# Patient Record
Sex: MALE | Race: WHITE | URBAN - METROPOLITAN AREA
[De-identification: names, ages, dates, MRNs, and addresses within clinical notes are randomized per-mention and may not be internally consistent; named-entity substitution may affect disease eponyms.]

---

## 2020-07-30 ENCOUNTER — TRANSFERRED RECORDS (OUTPATIENT)
Dept: HEALTH INFORMATION MANAGEMENT | Facility: CLINIC | Age: 55
End: 2020-07-30

## 2020-07-30 ENCOUNTER — TELEPHONE (OUTPATIENT)
Dept: ORTHOPEDICS | Facility: CLINIC | Age: 55
End: 2020-07-30

## 2020-07-30 NOTE — TELEPHONE ENCOUNTER
Health Call Center    Phone Message    May a detailed message be left on voicemail: yes     Reason for Call: Other:   Pt saw Jeronimo who performed left knee surgery for pt in 2010.  Pt was informed by a local physician that pt's implant needs to be redone. Pt would like to see Jeronimo for his opinion. Unable to pull up a template for [facility]. Please call pt back to assist with scheduling.     Action Taken: Other:  ortho    Travel Screening: Not Applicable

## 2020-07-31 NOTE — TELEPHONE ENCOUNTER
ATC called pt back but the number is not in service.    ATC will look for other numbers and reach out to pt again to schedule pt with Dr. Decker.        -[place], [facility]- Orthopedics

## 2020-08-03 NOTE — TELEPHONE ENCOUNTER
ATC looked through pt's chart and there's no other numbers. ATC called pt's home phone again and it stated that the number is not in service.        Please schedule pt with Dr. Decker when pt calls.          -MICK Lino- Orthopedics

## 2020-08-04 ENCOUNTER — TRANSFERRED RECORDS (OUTPATIENT)
Dept: HEALTH INFORMATION MANAGEMENT | Facility: CLINIC | Age: 55
End: 2020-08-04

## 2020-08-10 ENCOUNTER — TELEPHONE (OUTPATIENT)
Dept: ORTHOPEDICS | Facility: CLINIC | Age: 55
End: 2020-08-10

## 2020-08-10 NOTE — TELEPHONE ENCOUNTER
[facility] Call Center    Phone Message    May a detailed message be left on voicemail: yes     Reason for Call: Other: Unable to find a template to schedule with Dr Decker but pt would like a call back at [phone]     Action Taken: Message routed to:  [facility] Ortho    Travel Screening: Not Applicable

## 2020-08-11 NOTE — TELEPHONE ENCOUNTER
ATC called pt and scheduled pt with Dr. Decker. He stated that he had XR done two wks ago at [facility] in [place], [place]. Our coordinator will request images.            -MICK Lino- Orthopedics

## 2020-08-11 NOTE — TELEPHONE ENCOUNTER
RECORDS RECEIVED FROM: pt saw Dr. Decker before for left knee surgery in 2010 // had X[facility] wks ago at [facility] [place], [place]   DATE RECEIVED: Aug 20, 2020   NOTES STATUS DETAILS   OFFICE NOTE from referring provider Internal    OFFICE NOTE from other specialist N/A    DISCHARGE SUMMARY from hospital N/A    DISCHARGE REPORT from the ER N/A    OPERATIVE REPORT Internal    MEDICATION LIST Internal    IMPLANT RECORD/STICKER Internal    LABS     CBC/DIFF N/A    CULTURES N/A    INJECTIONS DONE IN RADIOLOGY N/A    MRI N/A    CT SCAN N/A    XRAYS (IMAGES & REPORTS) In process    TUMOR     PATHOLOGY  Slides & report N/A    08/13/20   2:56 PM   Images in PACS  Concepcion Khan CMA    08/11/20   3:48 PM    records scanned to chart  Concepcion Khan CMA    08/11/20   10:20 AM   Request sent to  in [place] for images  Concepcion Khan CMA

## 2020-08-20 ENCOUNTER — OFFICE VISIT (OUTPATIENT)
Dept: ORTHOPEDICS | Facility: CLINIC | Age: 55
End: 2020-08-20
Payer: COMMERCIAL

## 2020-08-20 ENCOUNTER — PREP FOR PROCEDURE (OUTPATIENT)
Dept: ORTHOPEDICS | Facility: CLINIC | Age: 55
End: 2020-08-20

## 2020-08-20 ENCOUNTER — PRE VISIT (OUTPATIENT)
Dept: ORTHOPEDICS | Facility: CLINIC | Age: 55
End: 2020-08-20

## 2020-08-20 DIAGNOSIS — Z96.652 STATUS POST TOTAL LEFT KNEE REPLACEMENT: ICD-10-CM

## 2020-08-20 DIAGNOSIS — T84.023A: ICD-10-CM

## 2020-08-20 DIAGNOSIS — Z96.652 STATUS POST TOTAL LEFT KNEE REPLACEMENT: Primary | ICD-10-CM

## 2020-08-20 DIAGNOSIS — M25.362 INSTABILITY OF LEFT KNEE JOINT: Primary | ICD-10-CM

## 2020-08-20 PROBLEM — Z96.659 S/P TKR (TOTAL KNEE REPLACEMENT): Status: ACTIVE | Noted: 2020-08-20

## 2020-08-20 NOTE — LETTER
8/20/2020         RE: Meliton Mortensen  [address] [facility] [address]        Dear Colleague,    Thank you for referring your patient, Meliton Mortensen, to the [facility]. Please see a copy of my visit note below.    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 Decker, here today to discuss progressive left knee pain, instability, and swelling.  He reports his symptoms are activity dependent and can be reproducibly caused by activity such as climbing stairs normally, twisting while standing, or biking for over 30 minutes.  He has had to limit his activity and change how he does simple activities such as climbing up the stairs as result of the symptoms.  His pain he states he can live with, but his instability has become a real problem and he constantly needs to be cautious and aware of how he is moving his knee to avoid falling.  He denies any recent fevers, chills, night sweats, or other joint issues.    He is interested in discussing options to treat his symptoms and is looking for a more permanent solution if we have one.    No past medical history on file.    No past surgical history on file.    No family history on file.    Social History     Tobacco Use     Smoking status: Not on file   Substance Use Topics     Alcohol use: Not on file         Meds:   No current outpatient medications on file.     No current facility-administered medications for this visit.        Allergies:  No Known Allergies    Review of Systems:  See end of note    Physical Exam: There were no vitals taken for this visit.  Well-appearing fit male in no acute distress.  He is here with his wife today.  Nonlabored breathing on room air.  Well-dressed and groomed, normal affect.  Focused exam of his left lower extremity  reveals a well-healed surgical incision over the anterior knee.  There is no appreciable knee effusion today, no asymmetric knee warmth, no erythema over the knee.  His knee active range of motion is from 3 degrees to 115 degrees with no pain.  He is most focally tender over the anteromedial distal femur.  No crepitus with knee motion.  No clinically detectable instability with varus or valgus stress in full extension or 90 degrees of flexion.  No excessive motion with anterior posterior drawer.  Quad strength is 5 out of 5 actively as well as hamstring strength.  Foot is warm and well-perfused with normal sensation and palpable PT pulses.    Imaging:  X-rays of his left knee taken at outside hospital on 7/30/2020 were reviewed and compared to prior imaging on 2/2/2007.  There is new ostial lysis that is very apparent around the distal femur components stem with associated cortical widening and remodeling around the tip of the stem.  Seen well on the lateral view is ostial lysis around the anterior distal femur under the flange and at the distal femur behind the posterior condyles.  The proximal tibial component also has some notable ostial lysis most visibly on the posterior aspect of the implant, with no obvious progression in alignment of the overall implant and no obvious evidence of ostial lysis or loosening around the cemented stem.    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  We think that his distal femur component is almost certainly loose, but we are not so sure about the proximal tibial component.  We discussed options for the patient, including continued observation and activity modification and surgical options which would be a revision hinged total knee arthroplasty.  We discussed the presumed etiology of his loosening due to  ostial lysis secondary to particulate wear, but we also discussed that we would need to rule out infection prior to any surgical intervention.  The patient would like to proceed with planning for surgical revision total knee arthroplasty.    Plan:  1.  CRP and ESR will be ordered today to screen for possible prosthetic joint infection.  If these are elevated, then we will likely need to obtain a joint fluid sample to rule out prosthetic joint infection.    2.  We will start the process of scheduling a left knee revision hinged total knee arthroplasty.  This will be coordinated with Dr. Decker's schedule and the patient's preference.    Patient was also examined by Dr. Decker and he agrees with the plan of care.     Checo Gonzalez MD  Orthopedic Surgery PGY-4  Pager: [phone]      Diagnosis: Chondrosarcoma left proximal tibia    Treatment: Hinged knee replacement, 2004    Patient is seen today in consultation.  He is having symptoms of giving way pain and swelling in the left knee.  He reports this is progressive and the discomfort is unpredictable he otherwise feels well he denies fever chills or night sweats.  He remains active.    Past medical history and medications are outlined by Dr. Gonzalez.  I saw him today with Dr. Gonzalez.  I agree with his assessment and plan.    I explained to the patient has believe he should have a revision of his left hinged knee replacement.  This will certainly involve the femoral side and will likely involve the tibial side as well.  I discussed with him at length the potential complications including infection extensor weakness, DVT, postoperative plain, implant failure, postoperative hematoma excessive drainage to mention some. he understands this and would like to proceed    Impression: Loosening hinged knee left.  Particularly on the femoral side.    Plan: 1.  I will speak with [facility] about a revision strategy.  2.  I reviewed the case with   Elijah.  3.  We will schedule him for a preanesthesia clinic visit and a 4-hour surgery.    Patient was seen as a new patient today for 20 minutes to 15 minutes spent answering questions coordinating his care.    Again, thank you for allowing me to participate in the care of your patient.        Sincerely,        Mike Decker MD

## 2020-08-20 NOTE — LETTER
Date:August 21, 2020      Patient was self referred, no letter generated. Do not send.        [facility] Health Information

## 2020-08-20 NOTE — PROGRESS NOTES
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 Decker, here today to discuss progressive left knee pain, instability, and swelling.  He reports his symptoms are activity dependent and can be reproducibly caused by activity such as climbing stairs normally, twisting while standing, or biking for over 30 minutes.  He has had to limit his activity and change how he does simple activities such as climbing up the stairs as result of the symptoms.  His pain he states he can live with, but his instability has become a real problem and he constantly needs to be cautious and aware of how he is moving his knee to avoid falling.  He denies any recent fevers, chills, night sweats, or other joint issues.    He is interested in discussing options to treat his symptoms and is looking for a more permanent solution if we have one.    No past medical history on file.    No past surgical history on file.    No family history on file.    Social History     Tobacco Use     Smoking status: Not on file   Substance Use Topics     Alcohol use: Not on file         Meds:   No current outpatient medications on file.     No current facility-administered medications for this visit.        Allergies:  No Known Allergies    Review of Systems:  See end of note    Physical Exam: There were no vitals taken for this visit.  Well-appearing fit male in no acute distress.  He is here with his wife today.  Nonlabored breathing on room air.  Well-dressed and groomed, normal affect.  Focused exam of his left lower extremity reveals a well-healed surgical incision over the anterior knee.  There is no appreciable knee effusion today, no asymmetric knee warmth, no erythema over the knee.  His knee active range of motion is from 3 degrees to 115 degrees with no pain.  He is most  focally tender over the anteromedial distal femur.  No crepitus with knee motion.  No clinically detectable instability with varus or valgus stress in full extension or 90 degrees of flexion.  No excessive motion with anterior posterior drawer.  Quad strength is 5 out of 5 actively as well as hamstring strength.  Foot is warm and well-perfused with normal sensation and palpable PT pulses.    Imaging:  X-rays of his left knee taken at outside hospital on 7/30/2020 were reviewed and compared to prior imaging on 2/2/2007.  There is new ostial lysis that is very apparent around the distal femur components stem with associated cortical widening and remodeling around the tip of the stem.  Seen well on the lateral view is ostial lysis around the anterior distal femur under the flange and at the distal femur behind the posterior condyles.  The proximal tibial component also has some notable ostial lysis most visibly on the posterior aspect of the implant, with no obvious progression in alignment of the overall implant and no obvious evidence of ostial lysis or loosening around the cemented stem.    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  We think that his distal femur component is almost certainly loose, but we are not so sure about the proximal tibial component.  We discussed options for the patient, including continued observation and activity modification and surgical options which would be a revision hinged total knee arthroplasty.  We discussed the presumed etiology of his loosening due to ostial lysis secondary to particulate wear, but we also discussed that we would need to rule out infection prior to any surgical intervention.  The patient would like to proceed with planning for surgical revision total knee arthroplasty.    Plan:  1.   CRP and ESR will be ordered today to screen for possible prosthetic joint infection.  If these are elevated, then we will likely need to obtain a joint fluid sample to rule out prosthetic joint infection.    2.  We will start the process of scheduling a left knee revision hinged total knee arthroplasty.  This will be coordinated with Dr. Decker's schedule and the patient's preference.    Patient was also examined by Dr. Decker and he agrees with the plan of care.     Checo Gonzalez MD  Orthopedic Surgery PGY-4  Pager: [phone]

## 2020-08-20 NOTE — NURSING NOTE
Chief Complaint   Patient presents with     Consult     left knee pain // instability // pt had surgery with Dr. Decker 10 yrs        54 year old  1965    There were no vitals taken for this visit.      Date/Surgery/Surgeon/Hospital:  1. Left knee Surgery -Dr. Decker 2010                 Pain Assessment  Patient Currently in Pain: Yes  0-10 Pain Scale: 2(can get up to 6)  Primary Pain Location: Knee(left)               [id number] - [place], [place] - [facility] [address] [facility] AT [facility]    No Known Allergies    No current outpatient medications on file.     No current facility-administered medications for this visit.

## 2020-08-20 NOTE — PROGRESS NOTES
Diagnosis: Chondrosarcoma left proximal tibia    Treatment: Hinged knee replacement, 2004    Patient is seen today in consultation.  He is having symptoms of giving way pain and swelling in the left knee.  He reports this is progressive and the discomfort is unpredictable he otherwise feels well he denies fever chills or night sweats.  He remains active.    Past medical history and medications are outlined by Dr. Gonzalez.  I saw him today with Dr. Gonzalez.  I agree with his assessment and plan.    I explained to the patient has believe he should have a revision of his left hinged knee replacement.  This will certainly involve the femoral side and will likely involve the tibial side as well.  I discussed with him at length the potential complications including infection extensor weakness, DVT, postoperative plain, implant failure, postoperative hematoma excessive drainage to mention some. he understands this and would like to proceed    Impression: Loosening hinged knee left.  Particularly on the femoral side.    Plan: 1.  I will speak with [facility] about a revision strategy.  2.  I reviewed the case with Dr. Nava.  3.  We will schedule him for a preanesthesia clinic visit and a 4-hour surgery.    Patient was seen as a new patient today for 20 minutes to 15 minutes spent answering questions coordinating his care.

## 2020-08-20 NOTE — NURSING NOTE
Teaching Flowsheet   Relevant Diagnosis:  Revision , total arthroplasty, knee left  Teaching Topic: preop     Person(s) involved in teaching:   Patient     Motivation Level:  Asks Questions: Yes  Eager to Learn: Yes  Cooperative: Yes  Receptive (willing/able to accept information): Yes  Any cultural factors/Muslim beliefs that may influence understanding or compliance? No       Patient and Family demonstrates understanding of the following:  Reason for the appointment, diagnosis and treatment plan: Yes  Knowledge of proper use of medications and conditions for which they are ordered (with special attention to potential side effects or drug interactions): Yes  Which situations necessitate calling provider and whom to contact: Yes            Proper use and care of soap (medical equip, care aids, etc.): Yes  Nutritional needs and diet plan: Yes  Pain management techniques: Yes  Wound Care: Yes  How and/when to access community resources: NA     Instructional Materials Used/Given: surgery packet reviewed with patient.  They will have PAC H&P.  COVID and PAC calls coming.  They have no other questions.  He is prepared to go home at discharge.

## 2020-08-21 ENCOUNTER — TELEPHONE (OUTPATIENT)
Dept: ORTHOPEDICS | Facility: CLINIC | Age: 55
End: 2020-08-21

## 2020-08-21 DIAGNOSIS — Z11.59 ENCOUNTER FOR SCREENING FOR OTHER VIRAL DISEASES: Primary | ICD-10-CM

## 2020-08-21 PROBLEM — Z96.652 STATUS POST TOTAL LEFT KNEE REPLACEMENT: Status: ACTIVE | Noted: 2020-08-21

## 2020-08-21 NOTE — TELEPHONE ENCOUNTER
Patient is scheduled for surgery with Dr. Decker      Spoke or left message with: Spoke with Meliton    Date of Surgery: 8/31/20    Location: [place]    Informed patient they will need an adult  Yes    Pre-op with surgeon (if applicable): n/a    H&P: Scheduled with PAC    Additional imaging/appointments: covid test scheduled    Surgery packet: Given in clinic     Additional comments: patient will receive arrival time at PAC appointment.

## 2020-08-21 NOTE — TELEPHONE ENCOUNTER
Attempted to reach out to patient to discuss scheduling surgery with Dr. Decker. Left detailed message that first available would be 8/31/20. Also informed patient we will need to get him scheduled for a PAC appointment. Left best call back number of [phone]

## 2020-08-24 NOTE — TELEPHONE ENCOUNTER
FUTURE VISIT INFORMATION      SURGERY INFORMATION:    Date: 20    Location: [facility]    Surgeon:  Mike Decker MD     Anesthesia Type:  general    Procedure: REVISION, TOTAL ARTHROPLASTY, KNEE     Consult: ov     RECORDS REQUESTED FROM:       Most recent EKG+ Tracin2006

## 2020-08-27 ENCOUNTER — PRE VISIT (OUTPATIENT)
Dept: SURGERY | Facility: CLINIC | Age: 55
End: 2020-08-27

## 2020-08-27 ENCOUNTER — OFFICE VISIT (OUTPATIENT)
Dept: SURGERY | Facility: CLINIC | Age: 55
End: 2020-08-27
Payer: COMMERCIAL

## 2020-08-27 ENCOUNTER — ANESTHESIA EVENT (OUTPATIENT)
Dept: SURGERY | Facility: CLINIC | Age: 55
DRG: 468 | End: 2020-08-27
Payer: COMMERCIAL

## 2020-08-27 VITALS
TEMPERATURE: 98.3 F | SYSTOLIC BLOOD PRESSURE: 123 MMHG | DIASTOLIC BLOOD PRESSURE: 83 MMHG | OXYGEN SATURATION: 97 % | RESPIRATION RATE: 16 BRPM | BODY MASS INDEX: 27.3 KG/M2 | WEIGHT: 206 LBS | HEIGHT: 73 IN | HEART RATE: 78 BPM

## 2020-08-27 DIAGNOSIS — T84.023A: ICD-10-CM

## 2020-08-27 DIAGNOSIS — Z01.818 PREOP EXAMINATION: Primary | ICD-10-CM

## 2020-08-27 DIAGNOSIS — T84.84XD PAIN DUE TO TOTAL LEFT KNEE REPLACEMENT, SUBSEQUENT ENCOUNTER: ICD-10-CM

## 2020-08-27 DIAGNOSIS — Z01.818 PREOP EXAMINATION: ICD-10-CM

## 2020-08-27 DIAGNOSIS — Z11.59 ENCOUNTER FOR SCREENING FOR OTHER VIRAL DISEASES: ICD-10-CM

## 2020-08-27 DIAGNOSIS — Z96.652 PAIN DUE TO TOTAL LEFT KNEE REPLACEMENT, SUBSEQUENT ENCOUNTER: ICD-10-CM

## 2020-08-27 LAB
ANION GAP SERPL CALCULATED.3IONS-SCNC: 2 MMOL/L (ref 3–14)
BUN SERPL-MCNC: 27 MG/DL (ref 7–30)
CALCIUM SERPL-MCNC: 9.8 MG/DL (ref 8.5–10.1)
CHLORIDE SERPL-SCNC: 106 MMOL/L (ref 94–109)
CO2 SERPL-SCNC: 31 MMOL/L (ref 20–32)
CREAT SERPL-MCNC: 1.42 MG/DL (ref 0.66–1.25)
CRP SERPL-MCNC: 3.5 MG/L (ref 0–8)
ERYTHROCYTE [DISTWIDTH] IN BLOOD BY AUTOMATED COUNT: 13.1 % (ref 10–15)
ERYTHROCYTE [SEDIMENTATION RATE] IN BLOOD BY WESTERGREN METHOD: 9 MM/H (ref 0–20)
GFR SERPL CREATININE-BSD FRML MDRD: 55 ML/MIN/{1.73_M2}
GLUCOSE SERPL-MCNC: 93 MG/DL (ref 70–99)
HCT VFR BLD AUTO: 47.3 % (ref 40–53)
HGB BLD-MCNC: 16.1 G/DL (ref 13.3–17.7)
MCH RBC QN AUTO: 30.7 PG (ref 26.5–33)
MCHC RBC AUTO-ENTMCNC: 34 G/DL (ref 31.5–36.5)
MCV RBC AUTO: 90 FL (ref 78–100)
PLATELET # BLD AUTO: 184 10E9/L (ref 150–450)
POTASSIUM SERPL-SCNC: 4.9 MMOL/L (ref 3.4–5.3)
RBC # BLD AUTO: 5.24 10E12/L (ref 4.4–5.9)
SODIUM SERPL-SCNC: 139 MMOL/L (ref 133–144)
WBC # BLD AUTO: 7.8 10E9/L (ref 4–11)

## 2020-08-27 ASSESSMENT — LIFESTYLE VARIABLES: TOBACCO_USE: 0

## 2020-08-27 ASSESSMENT — COPD QUESTIONNAIRES: COPD: 0

## 2020-08-27 ASSESSMENT — MIFFLIN-ST. JEOR: SCORE: 1828.29

## 2020-08-27 ASSESSMENT — PAIN SCALES - GENERAL: PAINLEVEL: MODERATE PAIN (4)

## 2020-08-27 NOTE — PATIENT INSTRUCTIONS
Preparing for Your Surgery      Name:  Meliton Mortensen   MRN:  [id number]   :  1965   Today's Date:  2020       Arriving for surgery:  Surgery date:  20  Arrival time:  11:30 am    Restrictions due to COVID 19:  Patients are allowed one visitor in the pre-op period  All visitors must wear a mask  No visitors under 18  No ill visitors   parking is not available     Please come to:     [facility] [address]  [address] [place] [address]  [id number]    -Enter through the [facility] entrance. If you prefer, park your car in the Green Lot.    -Proceed to the 3rd floor, check in at the Adult Surgery Waiting Lounge. [phone]    If an escort is needed stop at the Information Desk in the lobby. Inform the information person that you are here for surgery. An escort to the Adult Surgery Waiting Lounge will be provided.    What can I eat or drink?  -  You may eat and drink normally for up to 8 hours before your surgery. (Until 6:00 am)  -  You may have clear liquids until 2 hours before surgery. (Until 11:30 am)    Examples of clear liquids:  Water  Clear broth  Juices (apple, white grape, white cranberry  and cider) without pulp  Noncarbonated, powder based beverages  (lemonade and Rafita-Aid)  Sodas (Sprite, 7-Up, ginger ale and seltzer)  Coffee or tea (without milk or cream)  Gatorade    -  No Alcohol for at least 24 hours before surgery     Which medicines can I take?    Hold Aspirin for 7 days before surgery.   Hold Multivitamins for 7 days before surgery.  Hold Supplements for 7 days before surgery.  Hold Ibuprofen (Advil, Motrin) for 1 day before surgery--unless otherwise directed by surgeon.  Hold Naproxen (Aleve) for 4 days before surgery.    How do I prepare myself?  - Please take 2 showers before surgery using Scrubcare or Hibiclens soap.    Use this soap only from the neck to your toes.     Leave the soap on your skin for one minute--then  rinse thoroughly.      You may use your own shampoo and conditioner; no other hair products.   - Please remove all jewelry and body piercings.  - No lotions, deodorants or fragrance.  - Bring your ID and insurance card.    - All patients are required to have a Covid-19 test within 4 days of surgery/procedure.      -Patients will be contacted by the [facility] scheduling team within 1 week of surgery to make an appointment.      - Patients may call the Scheduling team at [phone] if they have not been scheduled within 4 days of  surgery.      ALL PATIENTS GOING HOME THE SAME DAY OF SURGERY ARE REQUIRED TO HAVE A RESPONSIBLE ADULT TO DRIVE AND BE IN ATTENDANCE WITH THEM FOR 24 HOURS FOLLOWING SURGERY     Questions or Concerns:    - For any questions regarding the day of surgery or your hospital stay, please contact the Pre Admission Nursing Office at [phone].       - If you have health changes between today and your surgery please call your surgeon.       For questions after surgery please call your surgeons office.     Enhanced Recovery After Surgery     This is a team effort, including you, to get you back on your feet, eating and drinking normally and out of the hospital as quickly as possible.  The goals are:    1) NO INFECTIONS and   2) RETURN TO NORMAL DIET    How can we achieve these goals?  1) STAY ACTIVE: Walk every day before your surgery; try to increase the amount every day.  Walk after surgery as much as you can-the nurses will help you.  Walking speeds healing and gets you home quicker, you heal better at home and have less risk of infection.     2) INCENTIVE SPIROMETER: Practice your incentive spirometer 4 times per day with 5 repetitions each time.  Using the incentive spirometer can strengthen your muscles between your ribs and help you have a strong cough after surgery.  A more effective cough can help prevent problems with your lungs.    3) STAY HYDRATED: Drink clear liquids up until  2 hours before your surgery. We would like you to purchase a drink such as Gatorade or Ensure Clear (not the milkshake type).  Drink this before bedtime and on the way into the hospital, drink between 8-10 ounces or until you feel hydrated.  Keeping well hydrated leads to your veins being plump, you wake up faster, and you are less likely to be nauseated. Start drinking water as soon as you can after surgery and advance to clear liquids and food as tolerated.  IV fluids contain salt, drinking fluids will minimize the amount of IV fluids you need and decrease the amount of salt you get.    The most common reason for the patient to be readmitted is dehydration. Staying hydrated after you go home from the hospital is very important.  Ensure or Ensure Clear are good options to keep you hydrated.     4) PAIN MANAGEMENT: If we minimize the amount of opioids and narcotics, and use regional blocks (which numb the area where your surgery is) along with oral pain medications; you will have less side effects of nausea and constipation. Narcotics can slow down your bowels and cause you to stay in the hospital longer.     Our goal is to keep you comfortable; eating and drinking normally and back home safely.

## 2020-08-27 NOTE — ANESTHESIA PREPROCEDURE EVALUATION
Anesthesia Pre-Procedure Evaluation    Patient: Meliton Mortensen   MRN:     [id number] Gender:   male   Age:    54 year old :      1965        Preoperative Diagnosis: Status post total left knee replacement [Z96.652]   Procedure(s):  REVISION, TOTAL ARTHROPLASTY, KNEE     LABS:  CBC:   Lab Results   Component Value Date    WBC 10.9 2007    WBC 7.7 2006    HGB 11.0 (L) 2007    HGB 11.2 (L) 2006    HCT 32.1 (L) 2007    HCT 42.3 2006     (H) 2007     2006     BMP:   Lab Results   Component Value Date     2006     2006    POTASSIUM 4.2 2006    POTASSIUM 4.7 2006    CHLORIDE 103 2006    CHLORIDE 107 2006    CO2 29 2006    CO2 29 2006    BUN 19 2006    BUN 27 (H) 2006    CR 1.30 2006    CR 1.30 2006     (H) 2006    GLC 90 2006     COAGS:   Lab Results   Component Value Date    INR 1.57 (H) 2007     POC:   Lab Results   Component Value Date    BGM 82 2006     OTHER:   Lab Results   Component Value Date    DAVID 8.3 (L) 2006        Preop Vitals    BP Readings from Last 3 Encounters:   No data found for BP    Pulse Readings from Last 3 Encounters:   No data found for Pulse      Resp Readings from Last 3 Encounters:   No data found for Resp    SpO2 Readings from Last 3 Encounters:   No data found for SpO2      Temp Readings from Last 1 Encounters:   No data found for Temp    Ht Readings from Last 1 Encounters:   No data found for Ht      Wt Readings from Last 1 Encounters:   No data found for Wt    There is no height or weight on file to calculate BMI.     LDA:        No past medical history on file.   No past surgical history on file.   No Known Allergies     Anesthesia Evaluation     . Pt has had prior anesthetic. Type: General    No history of anesthetic complications          ROS/MED HX    ENT/Pulmonary:      (-) tobacco use, asthma,  COPD, ERWIN risk factors and recent URI   Neurologic:     (+)neuropathy - feet,     Cardiovascular:  - neg cardiovascular ROS   (+) ----. : . . . :. . No previous cardiac testing       METS/Exercise Tolerance:  >4 METS   Hematologic:  - neg hematologic  ROS   (+) History of Transfusion no previous transfusion reaction -     (-) history of blood clots   Musculoskeletal:   (+)  other musculoskeletal- left pain, swelling and instability      GI/Hepatic:  - neg GI/hepatic ROS       Renal/Genitourinary:     (+) Other Renal/ Genitourinary, solitary kidney      Endo:  - neg endo ROS       Psychiatric:  - neg psychiatric ROS       Infectious Disease:  - neg infectious disease ROS      (-) Recent Fever   Malignancy:   (+) Malignancy History of Other  Other CA pheochromocytoma, chondrosarcoma Remission status post Surgery, Chemo and Radiation         Other:    (+) H/O Chronic Pain,                       PHYSICAL EXAM:   Mental Status/Neuro: A/A/O   Airway: Facies: Feasible  Mallampati: I  Mouth/Opening: Full  TM distance: > 6 cm  Neck ROM: Full   Respiratory: Auscultation: CTAB     Resp. Rate: Normal     Resp. Effort: Normal      CV: Rhythm: Regular  Rate: Age appropriate  Heart: Normal Sounds  Edema: LLE   Comments: #7 capped     Dental: Normal Dentition                Assessment:   ASA SCORE: 3    H&P: History and physical reviewed and following examination; no interval change.   Smoking Status:  Non-Smoker/Unknown   NPO Status: NPO Appropriate     Plan:   Anes. Type:  General   Pre-Medication: None   Induction:  IV (Standard)   Airway: ETT; Oral   Access/Monitoring: PIV   Maintenance: Balanced     Postop Plan:   Postop Pain: Opioids  Postop Sedation/Airway: Not planned     PONV Management:   Adult Risk Factors:, Non-Smoker, Postop Opioids   Prevention: Ondansetron, Dexamethasone     CONSENT: Direct conversation   Plan and risks discussed with: Patient   Blood Products: Consented (ALL Blood Products)                PAC  Discussion and Assessment    ASA Classification: 2  Case is suitable for: [facility]  Anesthetic techniques and relevant risks discussed: GA  Invasive monitoring and risk discussed:   Types:   Possibility and Risk of blood transfusion discussed:   NPO instructions given:   Additional anesthetic preparation and risks discussed:   Needs early admission to pre-op area:   Other:     PAC Resident/NP Anesthesia Assessment:  Meliton Mortensen is a 54 year old male scheduled for a REVISION, TOTAL ARTHROPLASTY, KNEE, left on 8/31/20 by Dr. Decker in treatment of left knee pain, swelling and instability.  PAC referral for risk assessment and optimization for anesthesia with comorbid conditions of: history of pheochromocytoma (multiple locations) and left leg chondrosarcoma.      Pre-operative considerations:  1.  Cardiac:  Functional status- METS >4.  He enjoys biking, walking and kayaking for exercise.  He has no known cardiac conditions.  Intermediate risk surgery with 0.4% risk of major adverse cardiac event.   2.  Pulm:  Airway feasible.  ERWIN risk: low  3.  GI:  Risk of PONV score = 2.  If > 2, anti-emetic intervention recommended.  4. Renal: s/p right nephrectomy due to pheochromocytoma.   Creatinine today is elevated at 1.42.  I have no prior labs to compare it to.  Patient has been called and notified.  Encouraged to hydrate well between now and surgery.  Will order repeat testing for DOS.         VTE risk: 0.5%    Patient is optimized and is acceptable candidate for the proposed procedure.  No further diagnostic evaluation is needed.      **For further details of assessment, testing, and physical exam please see H and P completed on same date.          Keshia Recinos DNP, RN, APRN      Reviewed and Signed by PAC Mid-Level Provider/Resident  Mid-Level Provider/Resident: Keshia Recinos DNP, RN, APRN  Date: 8/27/20  Time: 1535    Attending Anesthesiologist Anesthesia Assessment:        Anesthesiologist:   Date:   Time:    Pass/Fail:   Disposition:     PAC Pharmacist Assessment:        Pharmacist:   Date:   Time:    FERNANDA Vázquez CNP

## 2020-08-27 NOTE — H&P
Pre-Operative H & P     CC:  Preoperative exam to assess for increased cardiopulmonary risk while undergoing surgery and anesthesia.    Date of Encounter: 8/27/2020  Primary Care Physician:  No primary care provider on file.  Associated diagnosis:  Left knee pain, swelling and instability    HPI  Meliton Mortensen is a 54 year old male who presents for pre-operative H & P in preparation for a REVISION, TOTAL ARTHROPLASTY, KNEE, left on 8/31/20 by Dr. Decker at [facility].     Meliton Mortensen is a 54 year old male with history of pheochromocytoma (multiple locations) and left leg chondrosarcoma that had a left knee replacement by Dr. Decker in 2006 due to a chondrosarcoma.  He started having pain, instability and swelling in the left knee about 1.5 years ago.  The knee instability has caused him to have to limit his activity some.  It was recommended by an outside provider that he consider repeat replacement so he returned here to consult with Dr. Decker.  The above listed surgery has now been recommended for treatment.      History is obtained from the patient and the medical record.     Past Medical History  Past Medical History:   Diagnosis Date     History of chondrosarcoma     left knee     Pheochromocytoma      Solitary kidney     s/p nephrectomy for pheochromocytoma       Past Surgical History  Past Surgical History:   Procedure Laterality Date     ABDOMEN SURGERY      pheochromocytoma resection x 2     APPENDECTOMY  1975     ARTHROSCOPY KNEE Left     ACL repair     AS TOTAL KNEE ARTHROPLASTY Left 2006     CARPAL TUNNEL RELEASE RT/LT Right      CRANIOTOMY  1991    pheochromocytoma resection     HERNIA REPAIR  2010     HRW SYLVESTER CATH Left      INNER EAR SURGERY      perferation repair     NEPHRECTOMY RT/LT Right     secondary to pheochromocytoma     RELEASE ULNAR NERVE (ELBOW) Bilateral        Hx of Blood transfusions/reactions: yes, no reactions    Hx of  abnormal bleeding or anti-platelet use: none    Steroid use in the last year: none    Personal or FH with difficulty with Anesthesia:  none    Prior to Admission Medications  No current outpatient medications on file.       Allergies  No Known Allergies    Social History  Social History     Socioeconomic History     Marital status:      Spouse name: Not on file     Number of children: 3     Years of education: Not on file     Highest education level: Not on file   Occupational History     Occupation:    Social Needs     Financial resource strain: Not on file     Food insecurity     Worry: Not on file     Inability: Not on file     Transportation needs     Medical: Not on file     Non-medical: Not on file   Tobacco Use     Smoking status: Never Smoker     Smokeless tobacco: Never Used   Substance and Sexual Activity     Alcohol use: Yes     Alcohol/week: 4.0 standard drinks     Types: 4 Standard drinks or equivalent per week     Drug use: Never     Sexual activity: Not on file   Lifestyle     Physical activity     Days per week: Not on file     Minutes per session: Not on file     Stress: Not on file   Relationships     Social connections     Talks on phone: Not on file     Gets together: Not on file     Attends Gnosticism service: Not on file     Active member of club or organization: Not on file     Attends meetings of clubs or organizations: Not on file     Relationship status: Not on file     Intimate partner violence     Fear of current or ex partner: Not on file     Emotionally abused: Not on file     Physically abused: Not on file     Forced sexual activity: Not on file   Other Topics Concern     Not on file   Social History Narrative     Not on file       Family History  Family History   Problem Relation Age of Onset     Arthritis Mother      Valvular heart disease Father      No Known Problems Sister      No Known Problems Brother      No Known Problems Sister      No Known Problems  "Sister      No Known Problems Sister      No Known Problems Brother      No Known Problems Brother      No Known Problems Brother                ROS/MED HX  The complete review of systems is negative other than noted in the HPI or here.   ENT/Pulmonary:      (-) tobacco use, asthma, COPD, ERWIN risk factors and recent URI   Neurologic:     (+)neuropathy - feet,     Cardiovascular:  - neg cardiovascular ROS   (+) ----. : . . . :. . No previous cardiac testing       METS/Exercise Tolerance:  >4 METS   Hematologic:  - neg hematologic  ROS   (+) History of Transfusion no previous transfusion reaction -     (-) history of blood clots   Musculoskeletal:   (+)  other musculoskeletal- left pain, swelling and instability      GI/Hepatic:  - neg GI/hepatic ROS       Renal/Genitourinary:     (+) Other Renal/ Genitourinary, solitary kidney      Endo:  - neg endo ROS       Psychiatric:  - neg psychiatric ROS       Infectious Disease:  - neg infectious disease ROS      (-) Recent Fever   Malignancy:   (+) Malignancy History of Other  Other CA pheochromocytoma, chondrosarcoma Remission status post Surgery, Chemo and Radiation         Other:    (+) H/O Chronic Pain,                       PHYSICAL EXAM:   Mental Status/Neuro: A/A/O   Airway: Facies: Feasible  Mallampati: I  Mouth/Opening: Full  TM distance: > 6 cm  Neck ROM: Full   Respiratory: Auscultation: CTAB     Resp. Rate: Normal     Resp. Effort: Normal      CV: Rhythm: Regular  Rate: Age appropriate  Heart: Normal Sounds  Edema: None   Comments: #7 capped     Dental: Normal Dentition                  Temp: 98.3  F (36.8  C) Temp src: Oral BP: 123/83 Pulse: 78   Resp: 16 SpO2: 97 %         206 lbs 0 oz  6' 1\"   Body mass index is 27.18 kg/m .       Physical Exam  Constitutional: Awake, alert, cooperative, no apparent distress, and appears stated age.  Eyes: Pupils equal, round and reactive to light, extra ocular muscles intact, sclera clear, conjunctiva normal.  HENT: " Normocephalic, oral pharynx with moist mucus membranes, good dentition. No goiter appreciated.   Respiratory: Clear to auscultation bilaterally, no crackles or wheezing.  Cardiovascular: Regular rate and rhythm, normal S1 and S2, and no murmur noted.  Carotids +2, no bruits. 1+ LLE edema. Palpable pulses to radial  DP and PT arteries.   GI: Normal bowel sounds, soft, non-distended, non-tender, no masses palpated, no hepatosplenomegaly.  Surgical scars: midline abdomen  Lymph/Hematologic: No cervical lymphadenopathy and no supraclavicular lymphadenopathy.  Genitourinary:  deferred  Skin: Warm and dry.  No rashes at anticipated surgical site.   Musculoskeletal: Full ROM of neck. There is no redness, warmth, or swelling of the exposed joints except for swelling of the left knee.  Gross motor strength is normal.    Neurologic: Awake, alert, oriented to name, place and time. Cranial nerves II-XII are grossly intact. Gait is normal.   Neuropsychiatric: Calm, cooperative. Normal affect.     Labs: (personally reviewed)   Component      Latest Ref Rng & Units 8/27/2020   Sodium      133 - 144 mmol/L 139   Potassium      3.4 - 5.3 mmol/L 4.9   Chloride      94 - 109 mmol/L 106   Carbon Dioxide      20 - 32 mmol/L 31   Anion Gap      3 - 14 mmol/L 2 (L)   Glucose      70 - 99 mg/dL 93   Urea Nitrogen      7 - 30 mg/dL 27   Creatinine      0.66 - 1.25 mg/dL 1.42 (H)   GFR Estimate      >60 mL/min/1.73:m2 55 (L)   GFR Estimate If Black      >60 mL/min/1.73:m2 64   Calcium      8.5 - 10.1 mg/dL 9.8   WBC      4.0 - 11.0 10e9/L 7.8   RBC Count      4.4 - 5.9 10e12/L 5.24   Hemoglobin      13.3 - 17.7 g/dL 16.1   Hematocrit      40.0 - 53.0 % 47.3   MCV      78 - 100 fl 90   MCH      26.5 - 33.0 pg 30.7   MCHC      31.5 - 36.5 g/dL 34.0   RDW      10.0 - 15.0 % 13.1   Platelet Count      150 - 450 10e9/L 184         ASSESSMENT and PLAN  Meliton Mortensen is a 54 year old male scheduled for a REVISION, TOTAL ARTHROPLASTY, KNEE, left on  8/31/20 by Dr. Decker in treatment of left knee pain, swelling and instability.  PAC referral for risk assessment and optimization for anesthesia with comorbid conditions of: history of pheochromocytoma (multiple locations) and left leg chondrosarcoma.      Pre-operative considerations:  1.  Cardiac:  Functional status- METS >4.  He enjoys biking, walking and kayaking for exercise.  He has no known cardiac conditions.  Intermediate risk surgery with 0.4% risk of major adverse cardiac event.   2.  Pulm:  Airway feasible.  ERWIN risk: low  3.  GI:  Risk of PONV score = 2.  If > 2, anti-emetic intervention recommended.  4. Renal: s/p right nephrectomy due to pheochromocytoma.   Creatinine today is elevated at 1.42.  I have no prior labs to compare it to.  Patient has been called and notified.  Encouraged to hydrate well between now and surgery.  Will order repeat testing for DOS.       VTE risk: 0.5%    Patient is optimized and is acceptable candidate for the proposed procedure.  No further diagnostic evaluation is needed.            Keshia Recinos DNP, RN, APRN  Preoperative Assessment Center  [facility]  Phone: [phone]  Fax: [phone]

## 2020-08-28 LAB
SARS-COV-2 RNA SPEC QL NAA+PROBE: NOT DETECTED
SPECIMEN SOURCE: NORMAL

## 2020-08-31 ENCOUNTER — ANESTHESIA (OUTPATIENT)
Dept: SURGERY | Facility: CLINIC | Age: 55
DRG: 468 | End: 2020-08-31
Payer: COMMERCIAL

## 2020-08-31 ENCOUNTER — ANCILLARY PROCEDURE (OUTPATIENT)
Dept: ULTRASOUND IMAGING | Facility: CLINIC | Age: 55
End: 2020-08-31
Attending: ANESTHESIOLOGY
Payer: COMMERCIAL

## 2020-08-31 ENCOUNTER — HOSPITAL ENCOUNTER (INPATIENT)
Facility: CLINIC | Age: 55
LOS: 3 days | Discharge: HOME | DRG: 468 | End: 2020-09-03
Attending: ORTHOPAEDIC SURGERY | Admitting: ORTHOPAEDIC SURGERY
Payer: COMMERCIAL

## 2020-08-31 ENCOUNTER — APPOINTMENT (OUTPATIENT)
Dept: GENERAL RADIOLOGY | Facility: CLINIC | Age: 55
DRG: 468 | End: 2020-08-31
Attending: ORTHOPAEDIC SURGERY
Payer: COMMERCIAL

## 2020-08-31 ENCOUNTER — APPOINTMENT (OUTPATIENT)
Dept: GENERAL RADIOLOGY | Facility: CLINIC | Age: 55
DRG: 468 | End: 2020-08-31
Attending: STUDENT IN AN ORGANIZED HEALTH CARE EDUCATION/TRAINING PROGRAM
Payer: COMMERCIAL

## 2020-08-31 DIAGNOSIS — Z96.652 STATUS POST TOTAL LEFT KNEE REPLACEMENT: ICD-10-CM

## 2020-08-31 DIAGNOSIS — Z98.890 STATUS POST KNEE SURGERY: Primary | ICD-10-CM

## 2020-08-31 LAB
ABO + RH BLD: NORMAL
ABO + RH BLD: NORMAL
BLD GP AB SCN SERPL QL: NORMAL
BLOOD BANK CMNT PATIENT-IMP: NORMAL
BLOOD BANK CMNT PATIENT-IMP: NORMAL
CREAT SERPL-MCNC: 1.26 MG/DL (ref 0.66–1.25)
GFR SERPL CREATININE-BSD FRML MDRD: 64 ML/MIN/{1.73_M2}
GLUCOSE BLDC GLUCOMTR-MCNC: 74 MG/DL (ref 70–99)
SPECIMEN EXP DATE BLD: NORMAL

## 2020-08-31 PROCEDURE — 36000072 ZZH SURGERY LEVEL 5 W FLUORO 1ST 30 MIN - UMMC: Performed by: ORTHOPAEDIC SURGERY

## 2020-08-31 PROCEDURE — 25000566 ZZH SEVOFLURANE, EA 15 MIN: Performed by: ORTHOPAEDIC SURGERY

## 2020-08-31 PROCEDURE — 87176 TISSUE HOMOGENIZATION CULTR: CPT | Performed by: ORTHOPAEDIC SURGERY

## 2020-08-31 PROCEDURE — 40000986 XR KNEE PORT LT 1/2 VW: Mod: LT

## 2020-08-31 PROCEDURE — 25000128 H RX IP 250 OP 636: Performed by: ANESTHESIOLOGY

## 2020-08-31 PROCEDURE — 37000009 ZZH ANESTHESIA TECHNICAL FEE, EACH ADDTL 15 MIN: Performed by: ORTHOPAEDIC SURGERY

## 2020-08-31 PROCEDURE — 82565 ASSAY OF CREATININE: CPT | Performed by: NURSE PRACTITIONER

## 2020-08-31 PROCEDURE — 25800025 ZZH RX 258: Performed by: ORTHOPAEDIC SURGERY

## 2020-08-31 PROCEDURE — 87075 CULTR BACTERIA EXCEPT BLOOD: CPT | Performed by: ORTHOPAEDIC SURGERY

## 2020-08-31 PROCEDURE — 00000146 ZZHCL STATISTIC GLUCOSE BY METER IP

## 2020-08-31 PROCEDURE — 27210794 ZZH OR GENERAL SUPPLY STERILE: Performed by: ORTHOPAEDIC SURGERY

## 2020-08-31 PROCEDURE — 0SPD0JZ REMOVAL OF SYNTHETIC SUBSTITUTE FROM LEFT KNEE JOINT, OPEN APPROACH: ICD-10-PCS | Performed by: ORTHOPAEDIC SURGERY

## 2020-08-31 PROCEDURE — 25000125 ZZHC RX 250: Performed by: NURSE ANESTHETIST, CERTIFIED REGISTERED

## 2020-08-31 PROCEDURE — 40000170 ZZH STATISTIC PRE-PROCEDURE ASSESSMENT II: Performed by: ORTHOPAEDIC SURGERY

## 2020-08-31 PROCEDURE — 25000128 H RX IP 250 OP 636: Performed by: STUDENT IN AN ORGANIZED HEALTH CARE EDUCATION/TRAINING PROGRAM

## 2020-08-31 PROCEDURE — 37000008 ZZH ANESTHESIA TECHNICAL FEE, 1ST 30 MIN: Performed by: ORTHOPAEDIC SURGERY

## 2020-08-31 PROCEDURE — 0SRD0JA REPLACEMENT OF LEFT KNEE JOINT WITH SYNTHETIC SUBSTITUTE, UNCEMENTED, OPEN APPROACH: ICD-10-PCS | Performed by: ORTHOPAEDIC SURGERY

## 2020-08-31 PROCEDURE — 25000132 ZZH RX MED GY IP 250 OP 250 PS 637: Performed by: STUDENT IN AN ORGANIZED HEALTH CARE EDUCATION/TRAINING PROGRAM

## 2020-08-31 PROCEDURE — 71000014 ZZH RECOVERY PHASE 1 LEVEL 2 FIRST HR: Performed by: ORTHOPAEDIC SURGERY

## 2020-08-31 PROCEDURE — 0QPC04Z REMOVAL OF INTERNAL FIXATION DEVICE FROM LEFT LOWER FEMUR, OPEN APPROACH: ICD-10-PCS | Performed by: ORTHOPAEDIC SURGERY

## 2020-08-31 PROCEDURE — 25000128 H RX IP 250 OP 636: Performed by: NURSE ANESTHETIST, CERTIFIED REGISTERED

## 2020-08-31 PROCEDURE — 12000001 ZZH R&B MED SURG/OB UMMC

## 2020-08-31 PROCEDURE — C1776 JOINT DEVICE (IMPLANTABLE): HCPCS | Performed by: ORTHOPAEDIC SURGERY

## 2020-08-31 PROCEDURE — 36000070 ZZH SURGERY LEVEL 5 EA 15 ADDTL MIN - UMMC: Performed by: ORTHOPAEDIC SURGERY

## 2020-08-31 PROCEDURE — 25000125 ZZHC RX 250: Performed by: ANESTHESIOLOGY

## 2020-08-31 PROCEDURE — 40000278 XR SURGERY CARM FLUORO LESS THAN 5 MIN: Mod: TC

## 2020-08-31 PROCEDURE — 25000132 ZZH RX MED GY IP 250 OP 250 PS 637: Performed by: PHYSICIAN ASSISTANT

## 2020-08-31 PROCEDURE — 27810169 ZZH OR IMPLANT GENERAL: Performed by: ORTHOPAEDIC SURGERY

## 2020-08-31 PROCEDURE — 36415 COLL VENOUS BLD VENIPUNCTURE: CPT | Performed by: NURSE PRACTITIONER

## 2020-08-31 PROCEDURE — 25000128 H RX IP 250 OP 636: Performed by: PHYSICIAN ASSISTANT

## 2020-08-31 PROCEDURE — 87070 CULTURE OTHR SPECIMN AEROBIC: CPT | Performed by: ORTHOPAEDIC SURGERY

## 2020-08-31 PROCEDURE — 25800030 ZZH RX IP 258 OP 636: Performed by: NURSE ANESTHETIST, CERTIFIED REGISTERED

## 2020-08-31 DEVICE — MODULAR ROTATING HINGE KNEE-AXLE
Type: IMPLANTABLE DEVICE | Site: KNEE | Status: FUNCTIONAL
Brand: MRH

## 2020-08-31 DEVICE — MRH TIBIAL ROTATING COMPONENT
Type: IMPLANTABLE DEVICE | Site: KNEE | Status: FUNCTIONAL
Brand: MRH

## 2020-08-31 DEVICE — MRH KNEE FEMORAL BUSHING
Type: IMPLANTABLE DEVICE | Site: KNEE | Status: FUNCTIONAL
Brand: MRH

## 2020-08-31 DEVICE — MRH KNEE BUMPER INSERT
Type: IMPLANTABLE DEVICE | Site: KNEE | Status: FUNCTIONAL
Brand: MRH

## 2020-08-31 DEVICE — BONE CEMENT SIMPLEX W/TOBRAMYCIN 6197-9-001: Type: IMPLANTABLE DEVICE | Site: KNEE | Status: FUNCTIONAL

## 2020-08-31 DEVICE — MRH KNEE TIBIAL SLEEVE
Type: IMPLANTABLE DEVICE | Site: KNEE | Status: FUNCTIONAL
Brand: MRH

## 2020-08-31 DEVICE — MRH KNEE TIBIAL INSERT
Type: IMPLANTABLE DEVICE | Site: KNEE | Status: FUNCTIONAL
Brand: MRH

## 2020-08-31 DEVICE — MRH KNEE FEMORAL COMPONENT
Type: IMPLANTABLE DEVICE | Site: KNEE | Status: FUNCTIONAL
Brand: MRH

## 2020-08-31 DEVICE — IMPLANTABLE DEVICE: Type: IMPLANTABLE DEVICE | Site: KNEE | Status: FUNCTIONAL

## 2020-08-31 RX ORDER — LIDOCAINE HYDROCHLORIDE 20 MG/ML
INJECTION, SOLUTION INFILTRATION; PERINEURAL PRN
Status: DISCONTINUED | OUTPATIENT
Start: 2020-08-31 | End: 2020-08-31

## 2020-08-31 RX ORDER — POLYETHYLENE GLYCOL 3350 17 G/17G
17 POWDER, FOR SOLUTION ORAL DAILY
Status: DISCONTINUED | OUTPATIENT
Start: 2020-09-01 | End: 2020-09-03 | Stop reason: HOSPADM

## 2020-08-31 RX ORDER — KETOROLAC TROMETHAMINE 30 MG/ML
30 INJECTION, SOLUTION INTRAMUSCULAR; INTRAVENOUS EVERY 6 HOURS
Status: DISCONTINUED | OUTPATIENT
Start: 2020-08-31 | End: 2020-09-01

## 2020-08-31 RX ORDER — PROPOFOL 10 MG/ML
INJECTION, EMULSION INTRAVENOUS PRN
Status: DISCONTINUED | OUTPATIENT
Start: 2020-08-31 | End: 2020-08-31

## 2020-08-31 RX ORDER — ONDANSETRON 2 MG/ML
4 INJECTION INTRAMUSCULAR; INTRAVENOUS EVERY 30 MIN PRN
Status: DISCONTINUED | OUTPATIENT
Start: 2020-08-31 | End: 2020-08-31 | Stop reason: HOSPADM

## 2020-08-31 RX ORDER — OXYCODONE HYDROCHLORIDE 5 MG/1
5 TABLET ORAL EVERY 4 HOURS PRN
Status: DISCONTINUED | OUTPATIENT
Start: 2020-08-31 | End: 2020-09-03 | Stop reason: HOSPADM

## 2020-08-31 RX ORDER — GLYCOPYRROLATE 0.2 MG/ML
INJECTION, SOLUTION INTRAMUSCULAR; INTRAVENOUS PRN
Status: DISCONTINUED | OUTPATIENT
Start: 2020-08-31 | End: 2020-08-31

## 2020-08-31 RX ORDER — MEPERIDINE HYDROCHLORIDE 25 MG/ML
12.5 INJECTION INTRAMUSCULAR; INTRAVENOUS; SUBCUTANEOUS
Status: DISCONTINUED | OUTPATIENT
Start: 2020-08-31 | End: 2020-08-31 | Stop reason: HOSPADM

## 2020-08-31 RX ORDER — DEXAMETHASONE SODIUM PHOSPHATE 10 MG/ML
INJECTION, SOLUTION INTRAMUSCULAR; INTRAVENOUS PRN
Status: DISCONTINUED | OUTPATIENT
Start: 2020-08-31 | End: 2020-08-31

## 2020-08-31 RX ORDER — FENTANYL CITRATE 50 UG/ML
25-50 INJECTION, SOLUTION INTRAMUSCULAR; INTRAVENOUS
Status: DISCONTINUED | OUTPATIENT
Start: 2020-08-31 | End: 2020-08-31 | Stop reason: HOSPADM

## 2020-08-31 RX ORDER — ACETAMINOPHEN 325 MG/1
650 TABLET ORAL EVERY 4 HOURS PRN
Status: DISCONTINUED | OUTPATIENT
Start: 2020-09-03 | End: 2020-09-03 | Stop reason: HOSPADM

## 2020-08-31 RX ORDER — ONDANSETRON 2 MG/ML
INJECTION INTRAMUSCULAR; INTRAVENOUS PRN
Status: DISCONTINUED | OUTPATIENT
Start: 2020-08-31 | End: 2020-08-31

## 2020-08-31 RX ORDER — ONDANSETRON 4 MG/1
4 TABLET, ORALLY DISINTEGRATING ORAL EVERY 30 MIN PRN
Status: DISCONTINUED | OUTPATIENT
Start: 2020-08-31 | End: 2020-08-31 | Stop reason: HOSPADM

## 2020-08-31 RX ORDER — ONDANSETRON 4 MG/1
4 TABLET, ORALLY DISINTEGRATING ORAL EVERY 6 HOURS PRN
Status: DISCONTINUED | OUTPATIENT
Start: 2020-08-31 | End: 2020-09-03 | Stop reason: HOSPADM

## 2020-08-31 RX ORDER — ACETAMINOPHEN 325 MG/1
975 TABLET ORAL ONCE
Status: DISCONTINUED | OUTPATIENT
Start: 2020-08-31 | End: 2020-08-31 | Stop reason: HOSPADM

## 2020-08-31 RX ORDER — NALOXONE HYDROCHLORIDE 0.4 MG/ML
.1-.4 INJECTION, SOLUTION INTRAMUSCULAR; INTRAVENOUS; SUBCUTANEOUS
Status: DISCONTINUED | OUTPATIENT
Start: 2020-08-31 | End: 2020-08-31 | Stop reason: HOSPADM

## 2020-08-31 RX ORDER — FLUMAZENIL 0.1 MG/ML
0.2 INJECTION, SOLUTION INTRAVENOUS
Status: DISCONTINUED | OUTPATIENT
Start: 2020-08-31 | End: 2020-08-31 | Stop reason: HOSPADM

## 2020-08-31 RX ORDER — TRANEXAMIC ACID 650 MG/1
1950 TABLET ORAL ONCE
Status: COMPLETED | OUTPATIENT
Start: 2020-08-31 | End: 2020-08-31

## 2020-08-31 RX ORDER — SODIUM CHLORIDE, SODIUM LACTATE, POTASSIUM CHLORIDE, CALCIUM CHLORIDE 600; 310; 30; 20 MG/100ML; MG/100ML; MG/100ML; MG/100ML
INJECTION, SOLUTION INTRAVENOUS CONTINUOUS
Status: DISCONTINUED | OUTPATIENT
Start: 2020-08-31 | End: 2020-08-31 | Stop reason: HOSPADM

## 2020-08-31 RX ORDER — SODIUM CHLORIDE, SODIUM LACTATE, POTASSIUM CHLORIDE, CALCIUM CHLORIDE 600; 310; 30; 20 MG/100ML; MG/100ML; MG/100ML; MG/100ML
INJECTION, SOLUTION INTRAVENOUS CONTINUOUS
Status: DISCONTINUED | OUTPATIENT
Start: 2020-09-01 | End: 2020-09-03 | Stop reason: HOSPADM

## 2020-08-31 RX ORDER — HYDROMORPHONE HCL/0.9% NACL/PF 0.2MG/0.2
.2-.4 SYRINGE (ML) INTRAVENOUS
Status: DISCONTINUED | OUTPATIENT
Start: 2020-08-31 | End: 2020-09-03 | Stop reason: HOSPADM

## 2020-08-31 RX ORDER — ACETAMINOPHEN 325 MG/1
975 TABLET ORAL EVERY 8 HOURS
Status: DISCONTINUED | OUTPATIENT
Start: 2020-08-31 | End: 2020-09-03 | Stop reason: HOSPADM

## 2020-08-31 RX ORDER — ASPIRIN 81 MG/1
162 TABLET ORAL DAILY
Status: DISCONTINUED | OUTPATIENT
Start: 2020-09-01 | End: 2020-09-03 | Stop reason: HOSPADM

## 2020-08-31 RX ORDER — CEFAZOLIN SODIUM 2 G/100ML
2 INJECTION, SOLUTION INTRAVENOUS EVERY 8 HOURS
Status: COMPLETED | OUTPATIENT
Start: 2020-09-01 | End: 2020-09-01

## 2020-08-31 RX ORDER — ONDANSETRON 2 MG/ML
4 INJECTION INTRAMUSCULAR; INTRAVENOUS EVERY 6 HOURS PRN
Status: DISCONTINUED | OUTPATIENT
Start: 2020-08-31 | End: 2020-09-03 | Stop reason: HOSPADM

## 2020-08-31 RX ORDER — HYDROMORPHONE HYDROCHLORIDE 1 MG/ML
.3-.5 INJECTION, SOLUTION INTRAMUSCULAR; INTRAVENOUS; SUBCUTANEOUS EVERY 5 MIN PRN
Status: DISCONTINUED | OUTPATIENT
Start: 2020-08-31 | End: 2020-08-31 | Stop reason: HOSPADM

## 2020-08-31 RX ORDER — BISACODYL 10 MG
10 SUPPOSITORY, RECTAL RECTAL DAILY PRN
Status: DISCONTINUED | OUTPATIENT
Start: 2020-08-31 | End: 2020-09-03 | Stop reason: HOSPADM

## 2020-08-31 RX ORDER — LIDOCAINE 40 MG/G
CREAM TOPICAL
Status: DISCONTINUED | OUTPATIENT
Start: 2020-08-31 | End: 2020-09-03 | Stop reason: HOSPADM

## 2020-08-31 RX ORDER — AMOXICILLIN 250 MG
2 CAPSULE ORAL 2 TIMES DAILY
Status: DISCONTINUED | OUTPATIENT
Start: 2020-09-01 | End: 2020-09-03 | Stop reason: HOSPADM

## 2020-08-31 RX ORDER — SODIUM CHLORIDE, SODIUM LACTATE, POTASSIUM CHLORIDE, CALCIUM CHLORIDE 600; 310; 30; 20 MG/100ML; MG/100ML; MG/100ML; MG/100ML
INJECTION, SOLUTION INTRAVENOUS CONTINUOUS PRN
Status: DISCONTINUED | OUTPATIENT
Start: 2020-08-31 | End: 2020-08-31

## 2020-08-31 RX ORDER — NALOXONE HYDROCHLORIDE 0.4 MG/ML
.1-.4 INJECTION, SOLUTION INTRAMUSCULAR; INTRAVENOUS; SUBCUTANEOUS
Status: DISCONTINUED | OUTPATIENT
Start: 2020-08-31 | End: 2020-08-31

## 2020-08-31 RX ORDER — OXYCODONE HYDROCHLORIDE 5 MG/1
5-10 TABLET ORAL
Status: DISCONTINUED | OUTPATIENT
Start: 2020-08-31 | End: 2020-09-03 | Stop reason: HOSPADM

## 2020-08-31 RX ORDER — NALOXONE HYDROCHLORIDE 0.4 MG/ML
.1-.4 INJECTION, SOLUTION INTRAMUSCULAR; INTRAVENOUS; SUBCUTANEOUS
Status: DISCONTINUED | OUTPATIENT
Start: 2020-08-31 | End: 2020-09-03 | Stop reason: HOSPADM

## 2020-08-31 RX ORDER — GABAPENTIN 100 MG/1
100 CAPSULE ORAL 3 TIMES DAILY
Status: DISCONTINUED | OUTPATIENT
Start: 2020-09-01 | End: 2020-09-03 | Stop reason: HOSPADM

## 2020-08-31 RX ORDER — CEFAZOLIN SODIUM 2 G/100ML
2 INJECTION, SOLUTION INTRAVENOUS
Status: COMPLETED | OUTPATIENT
Start: 2020-08-31 | End: 2020-08-31

## 2020-08-31 RX ORDER — DEXAMETHASONE SODIUM PHOSPHATE 4 MG/ML
INJECTION, SOLUTION INTRA-ARTICULAR; INTRALESIONAL; INTRAMUSCULAR; INTRAVENOUS; SOFT TISSUE PRN
Status: DISCONTINUED | OUTPATIENT
Start: 2020-08-31 | End: 2020-08-31

## 2020-08-31 RX ORDER — HYDROMORPHONE HYDROCHLORIDE 1 MG/ML
.3-.5 INJECTION, SOLUTION INTRAMUSCULAR; INTRAVENOUS; SUBCUTANEOUS EVERY 10 MIN PRN
Status: DISCONTINUED | OUTPATIENT
Start: 2020-08-31 | End: 2020-08-31 | Stop reason: HOSPADM

## 2020-08-31 RX ORDER — CEFAZOLIN SODIUM 1 G/3ML
1 INJECTION, POWDER, FOR SOLUTION INTRAMUSCULAR; INTRAVENOUS SEE ADMIN INSTRUCTIONS
Status: DISCONTINUED | OUTPATIENT
Start: 2020-08-31 | End: 2020-08-31 | Stop reason: HOSPADM

## 2020-08-31 RX ADMIN — HYDROMORPHONE HYDROCHLORIDE 0.25 MG: 1 INJECTION, SOLUTION INTRAMUSCULAR; INTRAVENOUS; SUBCUTANEOUS at 19:57

## 2020-08-31 RX ADMIN — Medication 20 MCG: at 17:17

## 2020-08-31 RX ADMIN — GLYCOPYRROLATE 0.1 MG: 0.2 INJECTION, SOLUTION INTRAMUSCULAR; INTRAVENOUS at 18:41

## 2020-08-31 RX ADMIN — DEXAMETHASONE SODIUM PHOSPHATE 2 MG: 10 INJECTION, SOLUTION INTRAMUSCULAR; INTRAVENOUS at 17:17

## 2020-08-31 RX ADMIN — Medication 1 G: at 20:03

## 2020-08-31 RX ADMIN — TRANEXAMIC ACID 1950 MG: 650 TABLET ORAL at 13:13

## 2020-08-31 RX ADMIN — ROCURONIUM BROMIDE 10 MG: 10 INJECTION INTRAVENOUS at 18:46

## 2020-08-31 RX ADMIN — FENTANYL CITRATE 50 MCG: 50 INJECTION, SOLUTION INTRAMUSCULAR; INTRAVENOUS at 22:30

## 2020-08-31 RX ADMIN — DOCUSATE SODIUM AND SENNOSIDES 2 TABLET: 8.6; 5 TABLET ORAL at 23:51

## 2020-08-31 RX ADMIN — ACETAMINOPHEN 975 MG: 325 TABLET, FILM COATED ORAL at 23:51

## 2020-08-31 RX ADMIN — DEXAMETHASONE SODIUM PHOSPHATE 4 MG: 4 INJECTION, SOLUTION INTRAMUSCULAR; INTRAVENOUS at 18:04

## 2020-08-31 RX ADMIN — SODIUM CHLORIDE, SODIUM LACTATE, POTASSIUM CHLORIDE, CALCIUM CHLORIDE: 600; 310; 30; 20 INJECTION, SOLUTION INTRAVENOUS at 21:23

## 2020-08-31 RX ADMIN — SODIUM CHLORIDE, SODIUM LACTATE, POTASSIUM CHLORIDE, CALCIUM CHLORIDE: 600; 310; 30; 20 INJECTION, SOLUTION INTRAVENOUS at 18:52

## 2020-08-31 RX ADMIN — GLYCOPYRROLATE 0.1 MG: 0.2 INJECTION, SOLUTION INTRAMUSCULAR; INTRAVENOUS at 18:37

## 2020-08-31 RX ADMIN — MIDAZOLAM 2 MG: 1 INJECTION INTRAMUSCULAR; INTRAVENOUS at 17:43

## 2020-08-31 RX ADMIN — Medication 2 G: at 18:03

## 2020-08-31 RX ADMIN — LIDOCAINE HYDROCHLORIDE 50 MG: 20 INJECTION, SOLUTION INFILTRATION; PERINEURAL at 17:49

## 2020-08-31 RX ADMIN — SUGAMMADEX 200 MG: 100 INJECTION, SOLUTION INTRAVENOUS at 21:34

## 2020-08-31 RX ADMIN — ROCURONIUM BROMIDE 50 MG: 10 INJECTION INTRAVENOUS at 17:50

## 2020-08-31 RX ADMIN — FENTANYL CITRATE 50 MCG: 50 INJECTION, SOLUTION INTRAMUSCULAR; INTRAVENOUS at 22:10

## 2020-08-31 RX ADMIN — KETOROLAC TROMETHAMINE 30 MG: 30 INJECTION, SOLUTION INTRAMUSCULAR at 23:51

## 2020-08-31 RX ADMIN — MIDAZOLAM 1 MG: 1 INJECTION INTRAMUSCULAR; INTRAVENOUS at 17:15

## 2020-08-31 RX ADMIN — EPINEPHRINE 20 ML: 1 INJECTION PARENTERAL at 17:17

## 2020-08-31 RX ADMIN — PROPOFOL 200 MG: 10 INJECTION, EMULSION INTRAVENOUS at 17:49

## 2020-08-31 RX ADMIN — HYDROMORPHONE HYDROCHLORIDE 0.25 MG: 1 INJECTION, SOLUTION INTRAMUSCULAR; INTRAVENOUS; SUBCUTANEOUS at 21:01

## 2020-08-31 RX ADMIN — FENTANYL CITRATE 50 MCG: 50 INJECTION, SOLUTION INTRAMUSCULAR; INTRAVENOUS at 18:09

## 2020-08-31 RX ADMIN — FENTANYL CITRATE 50 MCG: 50 INJECTION, SOLUTION INTRAMUSCULAR; INTRAVENOUS at 18:25

## 2020-08-31 RX ADMIN — HYDROMORPHONE HYDROCHLORIDE 0.25 MG: 1 INJECTION, SOLUTION INTRAMUSCULAR; INTRAVENOUS; SUBCUTANEOUS at 19:50

## 2020-08-31 RX ADMIN — SODIUM CHLORIDE, SODIUM LACTATE, POTASSIUM CHLORIDE, CALCIUM CHLORIDE: 600; 310; 30; 20 INJECTION, SOLUTION INTRAVENOUS at 17:49

## 2020-08-31 RX ADMIN — ONDANSETRON 4 MG: 2 INJECTION INTRAMUSCULAR; INTRAVENOUS at 21:04

## 2020-08-31 ASSESSMENT — MIFFLIN-ST. JEOR: SCORE: 1831.88

## 2020-08-31 NOTE — ANESTHESIA PROCEDURE NOTES
Peripheral Nerve Block Procedure Note  Staff -   Anesthesiologist:  Joshua Can MD      Performed By: anesthesiologist        Location: Pre-op  Procedure Start/Stop TImes:      8/31/2020 5:10 PM     8/31/2020 5:17 PM    patient identified, IV checked, site marked, risks and benefits discussed, informed consent, monitors and equipment checked, pre-op evaluation, at physician/surgeon's request and post-op pain management      Correct Patient: Yes      Correct Position: Yes      Correct Site: Yes      Correct Procedure: Yes      Correct Laterality:  Yes    Site Marked:  Yes  Procedure details:     Procedure:  Adductor canal    Laterality:  Left    Position:  Supine    Sterile Prep: chloraprep, mask and sterile gloves      Local skin infiltration:  None    Needle:  Short bevel    Needle gauge:  21    Needle length (inches):  4    Ultrasound: Yes      Ultrasound used to identify targeted nerve, plexus, or vascular structure and placed a needle adjacent to it      Permanent Image entered into patiient's record      Abnormal pain on injection: No      Blood Aspirated: No      Paresthesias:  No    Bleeding at site: No      Test dose negative for signs of intravascular injection: Yes      Bolus via:  Needle    Infusion Method:  Single Shot    Complications:  None  Assessment/Narrative:     Injection made incrementally with aspirations every (mL):  5

## 2020-09-01 ENCOUNTER — APPOINTMENT (OUTPATIENT)
Dept: PHYSICAL THERAPY | Facility: CLINIC | Age: 55
DRG: 468 | End: 2020-09-01
Attending: ORTHOPAEDIC SURGERY
Payer: COMMERCIAL

## 2020-09-01 LAB
ANION GAP SERPL CALCULATED.3IONS-SCNC: 7 MMOL/L (ref 3–14)
BUN SERPL-MCNC: 19 MG/DL (ref 7–30)
CALCIUM SERPL-MCNC: 8.2 MG/DL (ref 8.5–10.1)
CHLORIDE SERPL-SCNC: 106 MMOL/L (ref 94–109)
CO2 SERPL-SCNC: 27 MMOL/L (ref 20–32)
CREAT SERPL-MCNC: 1.33 MG/DL (ref 0.66–1.25)
GFR SERPL CREATININE-BSD FRML MDRD: 60 ML/MIN/{1.73_M2}
GLUCOSE SERPL-MCNC: 169 MG/DL (ref 70–99)
HGB BLD-MCNC: 12.8 G/DL (ref 13.3–17.7)
POTASSIUM SERPL-SCNC: 4.6 MMOL/L (ref 3.4–5.3)
SODIUM SERPL-SCNC: 140 MMOL/L (ref 133–144)

## 2020-09-01 PROCEDURE — 36415 COLL VENOUS BLD VENIPUNCTURE: CPT | Performed by: STUDENT IN AN ORGANIZED HEALTH CARE EDUCATION/TRAINING PROGRAM

## 2020-09-01 PROCEDURE — 40000894 ZZH STATISTIC OT IP EVAL DEFER

## 2020-09-01 PROCEDURE — 85018 HEMOGLOBIN: CPT | Performed by: STUDENT IN AN ORGANIZED HEALTH CARE EDUCATION/TRAINING PROGRAM

## 2020-09-01 PROCEDURE — 25000132 ZZH RX MED GY IP 250 OP 250 PS 637: Performed by: STUDENT IN AN ORGANIZED HEALTH CARE EDUCATION/TRAINING PROGRAM

## 2020-09-01 PROCEDURE — 97110 THERAPEUTIC EXERCISES: CPT | Mod: GP | Performed by: PHYSICAL THERAPIST

## 2020-09-01 PROCEDURE — 99207 ZZC CONSULT E&M CHANGED TO INITIAL LEVEL: CPT | Performed by: INTERNAL MEDICINE

## 2020-09-01 PROCEDURE — 99231 SBSQ HOSP IP/OBS SF/LOW 25: CPT | Performed by: INTERNAL MEDICINE

## 2020-09-01 PROCEDURE — 99222 1ST HOSP IP/OBS MODERATE 55: CPT | Performed by: INTERNAL MEDICINE

## 2020-09-01 PROCEDURE — 12000001 ZZH R&B MED SURG/OB UMMC

## 2020-09-01 PROCEDURE — 25000128 H RX IP 250 OP 636: Performed by: STUDENT IN AN ORGANIZED HEALTH CARE EDUCATION/TRAINING PROGRAM

## 2020-09-01 PROCEDURE — 97116 GAIT TRAINING THERAPY: CPT | Mod: GP | Performed by: PHYSICAL THERAPIST

## 2020-09-01 PROCEDURE — 97161 PT EVAL LOW COMPLEX 20 MIN: CPT | Mod: GP | Performed by: PHYSICAL THERAPIST

## 2020-09-01 PROCEDURE — 80048 BASIC METABOLIC PNL TOTAL CA: CPT | Performed by: STUDENT IN AN ORGANIZED HEALTH CARE EDUCATION/TRAINING PROGRAM

## 2020-09-01 PROCEDURE — 25800030 ZZH RX IP 258 OP 636: Performed by: STUDENT IN AN ORGANIZED HEALTH CARE EDUCATION/TRAINING PROGRAM

## 2020-09-01 RX ORDER — OXYCODONE HYDROCHLORIDE 5 MG/1
5-10 TABLET ORAL
Qty: 30 TABLET | Refills: 0 | Status: SHIPPED | OUTPATIENT
Start: 2020-09-01

## 2020-09-01 RX ORDER — POLYETHYLENE GLYCOL 3350 17 G/17G
17 POWDER, FOR SOLUTION ORAL DAILY
Qty: 7 PACKET | Refills: 0 | Status: SHIPPED | OUTPATIENT
Start: 2020-09-01

## 2020-09-01 RX ORDER — ACETAMINOPHEN 325 MG/1
650 TABLET ORAL EVERY 4 HOURS PRN
Qty: 1 BOTTLE | Refills: 0 | Status: SHIPPED | OUTPATIENT
Start: 2020-09-03

## 2020-09-01 RX ORDER — AMOXICILLIN 250 MG
2 CAPSULE ORAL 2 TIMES DAILY
Qty: 30 TABLET | Refills: 0 | Status: SHIPPED | OUTPATIENT
Start: 2020-09-01

## 2020-09-01 RX ADMIN — GABAPENTIN 100 MG: 100 CAPSULE ORAL at 08:07

## 2020-09-01 RX ADMIN — GABAPENTIN 100 MG: 100 CAPSULE ORAL at 19:16

## 2020-09-01 RX ADMIN — DOCUSATE SODIUM AND SENNOSIDES 2 TABLET: 8.6; 5 TABLET ORAL at 08:06

## 2020-09-01 RX ADMIN — POLYETHYLENE GLYCOL 3350 17 G: 17 POWDER, FOR SOLUTION ORAL at 08:06

## 2020-09-01 RX ADMIN — GABAPENTIN 100 MG: 100 CAPSULE ORAL at 14:40

## 2020-09-01 RX ADMIN — SODIUM CHLORIDE, POTASSIUM CHLORIDE, SODIUM LACTATE AND CALCIUM CHLORIDE: 600; 310; 30; 20 INJECTION, SOLUTION INTRAVENOUS at 04:28

## 2020-09-01 RX ADMIN — DOCUSATE SODIUM AND SENNOSIDES 2 TABLET: 8.6; 5 TABLET ORAL at 19:16

## 2020-09-01 RX ADMIN — ACETAMINOPHEN 975 MG: 325 TABLET, FILM COATED ORAL at 07:03

## 2020-09-01 RX ADMIN — ACETAMINOPHEN 975 MG: 325 TABLET, FILM COATED ORAL at 22:52

## 2020-09-01 RX ADMIN — CEFAZOLIN SODIUM 2 G: 2 INJECTION, SOLUTION INTRAVENOUS at 04:25

## 2020-09-01 RX ADMIN — CEFAZOLIN SODIUM 2 G: 2 INJECTION, SOLUTION INTRAVENOUS at 11:14

## 2020-09-01 RX ADMIN — ACETAMINOPHEN 975 MG: 325 TABLET, FILM COATED ORAL at 14:40

## 2020-09-01 RX ADMIN — ASPIRIN 162 MG: 81 TABLET, COATED ORAL at 08:07

## 2020-09-01 NOTE — ANESTHESIA CARE TRANSFER NOTE
Patient: Meliton Mortensen    Procedure(s):  LEFT REVISION, TOTAL ARTHROPLASTY, KNEE    Diagnosis: Status post total left knee replacement [Z96.652]  Diagnosis Additional Information: No value filed.    Anesthesia Type:   General     Note:  Airway :Face Mask  Patient transferred to:PACU  Comments: .Anesthesia Care Transfer Note    Patient: Meliton Mortensen    Transferred to: PACU    Patient vital signs: stable    Airway: none    Monitors applied, VSS.  Patient awake and comfortable, breathing spontaneously.  Report given to RN with transfer of care.        Elizabeth Min CRNA  8/31/2020  10:03 PM  Handoff Report: Identifed the Patient, Identified the Reponsible Provider, Reviewed the pertinent medical history, Discussed the surgical course, Reviewed Intra-OP anesthesia mangement and issues during anesthesia, Set expectations for post-procedure period and Allowed opportunity for questions and acknowledgement of understanding      Vitals: (Last set prior to Anesthesia Care Transfer)    CRNA VITALS  8/31/2020 2121 - 8/31/2020 2203      8/31/2020             NIBP:  135/81    Pulse:  96    NIBP Mean:  99    SpO2:  98 %                Electronically Signed By: FERNANDA Martinez CRNA  August 31, 2020  10:03 PM

## 2020-09-01 NOTE — PLAN OF CARE
Discharge Planner OT   Patient plan for discharge: home   Current status: order received. Per discussion with PT and pt, no acute OT needs identified. Pt demos ability to complete LE dressing and functional transfers SBA- IND. Pt has supportive spouse at home to A and has shower chair if needed. Will sign off and complete OT orders as no needs.   Barriers to return to prior living situation: defer to PT  Recommendations for discharge: home  Rationale for recommendations: no acute OT needs identified. Will sign off.        Entered by: Janice Phan 09/01/2020 10:35 AM

## 2020-09-01 NOTE — BRIEF OP NOTE
[facility], [place]    Brief Operative Note    Pre-operative diagnosis: Status post total left knee replacement [Z96.652]  Post-operative diagnosis Left TKA aseptic loosening of femoral component    Procedure: Procedure(s):  LEFT REVISION, TOTAL ARTHROPLASTY, KNEE  Surgeon: Surgeon(s) and Role:     * Mike Decker MD - Primary  Anesthesia: General   Estimated blood loss: 600cc  Drains: Bert-Hackett  Specimens:   ID Type Source Tests Collected by Time Destination   1 : LEFT KNEE #1 Tissue Knee, Left ANAEROBIC BACTERIAL CULTURE, TISSUE CULTURE AEROBIC BACTERIAL Mike Decker MD 8/31/2020  6:33 PM    2 : LEFT KNEE #2 Tissue Knee, Left ANAEROBIC BACTERIAL CULTURE, TISSUE CULTURE AEROBIC BACTERIAL Mike Decker MD 8/31/2020  6:34 PM    3 : LEFT KNEE #3 Tissue Knee, Left ANAEROBIC BACTERIAL CULTURE, TISSUE CULTURE AEROBIC BACTERIAL Mike Decker MD 8/31/2020  6:35 PM      Findings:   See Op Note. Tibial component well-fixed.  Complications: None.  Implants:   Implant Name Type Inv. Item Serial No.  Lot No. LRB No. Used Action   BONE CEMENT SIMPLEX FULL DOSE 6191-1-001 Cement, Bone BONE CEMENT SIMPLEX FULL DOSE 6191-1-001  [employer] [id number] Left 2 Wasted   [employer]- Left Total Knee      Left 1 Explanted   BONE CEMENT SIMPLEX W/TOBRAMYCIN 6197-9-001 Cement, Bone BONE CEMENT SIMPLEX W/TOBRAMYCIN 6197-9-001  [employer] [id number] Left 2 Implanted   IMP STEM FEM STRK FLUTED EXT [id number] TI [id number] Total Joint Component/Insert IMP STEM FEM STRK FLUTED EXT [id number] TI 6478-6-740  [employer] [id number] Left 1 Implanted   IMP COMP FEMORAL STRK MRH [id number] MED LT 6481-1-120 Total Joint Component/Insert IMP COMP FEMORAL STRK MRH 50L37HC MED LT 6481-1-120  [employer] [id number] Left 1 Implanted   IMP INSERT STRK KN MR BUSHING STD 6481-2-110 Total Joint Component/Insert IMP INSERT STRK KN MR BUSHING STD 6481-2-110  [employer] [id number] Left 1 Implanted   IMP INSERT TIBIAL SLEEVE STRK 6481-2-140 Total Joint Component/Insert IMP INSERT TIBIAL SLEEVE STRK 6481-2-140  [employer] [id number] Left 1 Implanted   IMP COMP TIBIAL STRK MRH 6481-2-100 Total Joint Component/Insert IMP COMP TIBIAL STRK MRH 6481-2-100  [employer] [id number] Left 1 Implanted   IMP INSERT STRK KN MRH AXLE STD 6481-2-120 Total Joint Component/Insert IMP INSERT STRK KN MRH AXLE STD 6481-2-120  [employer] [id number] Left 1 Implanted   IMP INSERT TIBIAL STRK GMRS MRH 20MM 6481-3-220 Total Joint Component/Insert IMP INSERT TIBIAL STRK GMRS MRH 20MM 6481-3-220  [employer] [id number] Left 1 Wasted   IMP INSERT STRK KN BUMPER NEUTAL 6481-2-130 Total Joint Component/Insert IMP INSERT STRK KN BUMPER NEUTAL 6481-2-130  [employer] [id number] Left 1 Implanted   IMP INSERT STRK KN MRH BUSHING STD 6481-2-110 Total Joint Component/Insert IMP INSERT STRK KN MRH BUSHING STD 6481-2-110  [employer] [id number] Left 1 Implanted   IMP INSERT TIBIAL STRK MRH M2/L2 20MM 6481-3-320 Total Joint Component/Insert IMP INSERT TIBIAL STRK MRH M2/L2 20MM 6481-3-320  [employer] [id number] Left 1 Implanted     Post-Op Plan:  Assessment/Plan: Meliton Mortensen is a 54 year old male s/p Revision Left Hinged TKA on 8/31/2020 with Dr. Decker.    Activity: Up with assist.  Weight bearing status: WBAT LLE, ROMAT.  Antibiotics: Ancef x 24 hours.  Diet: Begin with clear fluids and progress diet as tolerated.  DVT prophylaxis: ASA 162mg daily and mechanical while in the hospital, discharge on ASA 162mg daily x 4 weeks.  Bracing/Splinting: None  Elevation: Elevate LLE on pillows.  Wound Care: Keep Tegaderm/Alginate dressing in place x7 days  Drains: CRISTINA drain x1, document output per shift, discontinue per Ortho.  Pain management: transition from IV to orals as tolerated.  X-rays: PACU  Physical Therapy: Eval and Treat.  Occupational Therapy: Eval and Treat  Labs:  Trend Hgb on POD #1, 2  Cultures: Intraop Cultures taken x3, follow culture results (infection not suspected pre-op or intraop).  Consults: PT, OT, Medicine, appreciate assistance in caring for this patient.  Follow-up: Clinic with Dr. Decker's team in 2 weeks for wound check.    Disposition: Pending progress with therapies, pain control on orals, and medical stability, anticipate discharge to home on POD #2-3.    Checo Gonzalez MD  Orthopedic Surgery PGY-4  Pager: [phone]

## 2020-09-01 NOTE — PLAN OF CARE
VS: VSS. Denies CP/SOB   O2: >90% on RA   Output: Voiding adequate amounts w/o pain or difficulty   Last BM: 9/1/20   Activity: Up with 1 assist, walker.    Up for meals? Declined   Skin: Incision   Pain: Minimal pain, managing with scheduled tylenol   CMS: Intact    Dressing: CDI   Diet: Regular, tolerating well   LDA: PIV saline locked. CRISTINA    Equipment: IV pole, walker   Plan: TBD   Additional Info:

## 2020-09-01 NOTE — ANESTHESIA POSTPROCEDURE EVALUATION
Anesthesia POST Procedure Evaluation    Patient: Meliton Mortensen   MRN:     [id number] Gender:   male   Age:    54 year old :      1965        Preoperative Diagnosis: Status post total left knee replacement [Z96.652]   Procedure(s):  LEFT REVISION, TOTAL ARTHROPLASTY, KNEE   Postop Comments: No value filed.     Anesthesia Type: General          Postop Pain Control: Uneventful            Sign Out: Well controlled pain   PONV: No   Neuro/Psych: Uneventful            Sign Out: Acceptable/Baseline neuro status   Airway/Respiratory: Uneventful            Sign Out: Acceptable/Baseline resp. status   CV/Hemodynamics: Uneventful            Sign Out: Acceptable CV status   Other NRE: NONE   DID A NON-ROUTINE EVENT OCCUR? No         Last Anesthesia Record Vitals:  CRNA VITALS  2020 2121 - 2020 2221      2020             NIBP:  123/65    Pulse:  84          Last PACU Vitals:  Vitals Value Taken Time   /86 2020 11:33 PM   Temp 36.7  C (98  F) 2020 11:33 PM   Pulse 83 2020 11:33 PM   Resp 15 2020 11:33 PM   SpO2 93 % 2020 11:33 PM   Temp src     NIBP 123/65 2020 10:01 PM   Pulse 84 2020 10:01 PM   SpO2     Resp     Temp     Ht Rate 85 2020  9:57 PM   Temp 2           Electronically Signed By: Angelito Peña DO, 2020, 12:12 AM

## 2020-09-01 NOTE — PROGRESS NOTES
"Orthopedic Surgery Progress Note   September 1, 2020    Subjective: No acute events overnight. Pain very well controlled, already better than before surgery. Tolerating diet. Denies fever or chills, CP, SOB, numbness or tingling, motor dysfunction or weakness. Ready to work with PT. Hoping to discharge on Thursday with wife to home.     Objective: BP 98/64 (BP Location: Right arm)   Pulse 87   Temp 98.3  F (36.8  C) (Oral)   Resp 24   Ht 1.854 m (6' 1\")   Wt 93.8 kg (206 lb 12.7 oz)   SpO2 94%   BMI 27.28 kg/m      Drain: 260 -> 80cc last 2 shifts    General: NAD, alert and oriented, cooperative with exam.   Cardio: RRR, extremities wwp.   Respiratory: Non-labored breathing.  MSK: Focused examination of LLE: Toes wwp, DP 2+ and PT 2+, bcr in all toes. +EHL/FHL/GSC/TA with 5/5 strength. SILT SP/DP/Sa/Esquivel/T. Dressing c/d/i. Drain with serosanguinous output.    Labs:   Hgb 12.8  Cr: 1.33    Imaging: PACU knee XR reviewed, no obvious fractures. Interval revision of femoral component of hinged knee. Drain in place.     Assessment and Plan:   Assessment/Plan: Meliton Mortensen is a 54 year old male s/p Revision Left Hinged TKA on 8/31/2020 with Dr. Decker.     Activity: Up with assist.  Weight bearing status: WBAT LLE, ROMAT.  Antibiotics: Ancef x 24 hours.  Diet: Begin with clear fluids and progress diet as tolerated.  DVT prophylaxis: ASA 162mg daily and mechanical while in the hospital, discharge on ASA 162mg daily x 4 weeks.  Bracing/Splinting: None  Elevation: Elevate LLE on pillows.  Wound Care: Keep Tegaderm/Alginate dressing in place x7 days  Drains: CRISTINA drain x1, document output per shift, discontinue per Ortho.  Pain management: transition from IV to orals as tolerated.  X-rays: PACU  Physical Therapy: Eval and Treat.  Occupational Therapy: Eval and Treat  Labs: Trend Hgb on POD #1, 2  Cultures: Intraop Cultures taken x3, follow culture results (infection not suspected pre-op or intraop).  Consults: PT, " OT, Medicine, appreciate assistance in caring for this patient.  Follow-up: Clinic with Dr. Decker's team in 2 weeks for wound check.     Disposition: Pending progress with therapies, pain control on orals, and medical stability, anticipate discharge to home on POD #2-3.    Checo Gonzalez MD  Orthopedic Surgery PGY-4  Pager: [phone]

## 2020-09-01 NOTE — OR NURSING
PACU to Inpatient Nursing Handoff    Patient Meliton Mortensen is a 54 year old male who speaks English.   Procedure Procedure(s):  LEFT REVISION, TOTAL ARTHROPLASTY, KNEE   Surgeon(s) Primary: Mike Decker MD     No Known Allergies    Isolation  [unfilled]     Past Medical History   has a past medical history of History of chondrosarcoma, Pheochromocytoma, and Solitary kidney.    Anesthesia General   Dermatome Level     Preop Meds Not applicable      Nerve block Adductor canal.  Location:left. Med:bupivacaine and Total Knee/Hip Cocktail (ropivacaine, epinephrine, ketorolac, morphine). Time given: 1830   Intraop Meds dexamethasone (Decadron)  fentanyl (Sublimaze): 100 mcg total  hydromorphone (Dilaudid): .75 mg total  ondansetron (Zofran): last given at 2100   Local Meds Yes   Antibiotics cefazolin (Ancef) - last given at 2000     Pain Patient Currently in Pain: yes  Comfort: tolerable with discomfort  Pain Control: partially effective   PACU meds  fentanyl (Sublimaze): 100 mcg (total dose) last given at 2225    PCA / epidural No   Capnography     Telemetry ECG Rhythm: Normal sinus rhythm   Inpatient Telemetry Monitor Ordered? No        Labs Glucose Lab Results   Component Value Date    GLC 93 08/27/2020       Hgb Lab Results   Component Value Date    HGB 16.1 08/27/2020       INR Lab Results   Component Value Date    INR 1.57 01/02/2007      PACU Imaging Completed     Wound/Incision Incision/Surgical Site 08/31/20 Left;Anterior;Midline Knee (Active)   Incision Assessment UTV 08/31/20 2154   Emely-Incision Assessment UTV 08/31/20 2154   Incision Drainage Amount None 08/31/20 2154   Dressing Intervention Clean, dry, intact 08/31/20 2154   Number of days: 0       Incision/Surgical Site 08/31/20 Left;Medial Knee (Active)   Incision Assessment UTV 08/31/20 2154   Emely-Incision Assessment UTV 08/31/20 2154   Incision Drainage Amount None 08/31/20 2154   Dressing Intervention Clean, dry, intact 08/31/20 2154    Number of days: 0      CMS        Equipment ice pack   Other LDA       IV Access Peripheral IV 08/31/20 Left Lower forearm (Active)   Site Assessment WDL 08/31/20 2200   Line Status Infusing 08/31/20 2200   Phlebitis Scale 0-->no symptoms 08/31/20 1326   Infiltration Scale 0 08/31/20 1326   Number of days: 0      Blood Products Not applicable  mL   Intake/Output Date 08/31/20 0700 - 09/01/20 0659   Shift 9451-1753 0580-8684 0856-0245 24 Hour Total   INTAKE   I.V.  2100  2100   Shift Total(mL/kg)  2100(22.39)  2100(22.39)   OUTPUT   Urine  300  300   Blood  600  600   Shift Total(mL/kg)  900(9.6)  900(9.6)   Weight (kg) 93.8 93.8 93.8 93.8      Drains / [place] Closed/Suction Drain Left Knee Bulb 10 Marshall[place] (Active)   Site Description WDL 08/31/20 2200   Dressing Status Normal: Clean, Dry & Intact 08/31/20 2200   Drainage Appearance Bloody/Bright Red 08/31/20 2200   Number of days: 0       Urethral Catheter Double-lumen;Latex 16 fr (Active)   Tube Description UTV 08/31/20 2200   Collection Container Standard 08/31/20 2200   Securement Method Securing device (Describe) 08/31/20 2200   Number of days: 0      Time of void PreOp Void Prior to Procedure: 1115 (08/31/20 1256)    PostOp      Diapered? No   Bladder Scan     PO    water     Vitals    B/P: 124/77  T: 98.3  F (36.8  C)    Temp src: Oral  P:  Pulse: 92 (08/31/20 2215)          R: 14  O2:  SpO2: 95 %    O2 Device: Nasal cannula (08/31/20 2215)    Oxygen Delivery: 2 LPM (08/31/20 2215)         Family/support present wife Mary staying outside hospital   Patient belongings     Patient transported on bed   DC meds/scripts (obs/outpt) Not applicable   Inpatient Pain Meds Released? Yes       Special needs/considerations None   Tasks needing completion None       Rosemary Sanders, RN  [facility] [id number]

## 2020-09-01 NOTE — CONSULTS
HOSPITALIST CONSULTATION     REQUESTING PHYSICIAN: Mike Decker MD    REASON FOR CONSULTATION: Evaluation, Recommendations and Co-management of Medical Comorbidities.     ASSESSMENT & PLAN :     Meliton Mortensen is a 54 year old male admitted on 8/31/2020 s/p following procedure:  Left revision total knee arthroplasty for aseptic loosening.  Estimated blood loss 600 cc.  No apparent complication during the procedure.      PMH, Pre Op eval reviewed.   Currently doing well.        # Orthopedic Surgery:     Post Op Management per Primary team.     Hemodynamics: stable. Continue on gentle IV fluids, until adequate PO. Monitor I/o.   Post op capnography per protocol.     Pain control- per Primary team and/or Pain team.    Minimize use of narcotics as able. Consider bowel regimen with narcotics.   Encourage Incentive spirometry to prevent atelectasis.  DVT prophylaxis- per Primary team  Activity, antibiotics, wound and/or drain care - per Primary team.   Anemia of acute blood loss: Pre op Hgb 16.1. Monitor hgb for anemia of acute blood loss. Transfuse for hgb <7.0    # Renal: s/p right nephrectomy due to pheochromocytoma.  Solitary kidney.  Preop creatinine 1.4.  Creatinine today 1.26. bL: 1.3. Likely CKD.  Monitor closely.  Avoid nephrotoxic's.  Renal dosing.  Follow-up BMP.  Avoid NSAIDs    #No other significant medical concern.    Thank you for the consultation. Please page with any question. Hospitalist team to follow.      Hermilo Macario MD   Hospitalist, Internal Medicine  Pager: [phone].     CHIEF COMPLAINT: Doing well.    HISTORY OF PRESENT ILLNESS: Obtained from the patient and chart review including Pre op evaluation, procedure note.    54 year old year old male  with above discussed medical problems s/p above procedure admitted on 8/31/2020  for post op care and monitoring  (for further details for indication of surgery and operative note, please refer to Mike  Bhaskar Decker MD note). Medicine consulted to evaluate, recommend and/or co manage medical co morbidities.   No documented hypotension, hypoxemia or other significant complications intra or post operative.   Currently: Incisional Pain controlled . Denies any chest pain, shortness of breath or LH or palpitations. Denies any nausea, vomiting or pain abdomen. No fever or chills. Denies any dysuria.  Denies any new rash.   Medical issues as discussed above.   Denies any other medical concern.     PAST MEDICAL HISTORY:   Past Medical History:   Diagnosis Date     History of chondrosarcoma     left knee     Pheochromocytoma      Solitary kidney     s/p nephrectomy for pheochromocytoma       PAST SURGICAL HISTORY:   Past Surgical History:   Procedure Laterality Date     ABDOMEN SURGERY      pheochromocytoma resection x 2     APPENDECTOMY  1975     ARTHROSCOPY KNEE Left     ACL repair     AS TOTAL KNEE ARTHROPLASTY Left 2006     CARPAL TUNNEL RELEASE RT/LT Right      CRANIOTOMY  1991    pheochromocytoma resection     HERNIA REPAIR  2010     HRW SYLVESTER CATH Left      INNER EAR SURGERY      perferation repair     NEPHRECTOMY RT/LT Right     secondary to pheochromocytoma     RELEASE ULNAR NERVE (ELBOW) Bilateral        FH: reviewed.     Family History   Problem Relation Age of Onset     Arthritis Mother      Valvular heart disease Father      No Known Problems Sister      No Known Problems Brother      No Known Problems Sister      No Known Problems Sister      No Known Problems Sister      No Known Problems Brother      No Known Problems Brother      No Known Problems Brother         SH: reviewed.     Social History     Socioeconomic History     Marital status:      Spouse name: None     Number of children: 3     Years of education: None     Highest education level: None   Occupational History     Occupation:    Social Needs     Financial resource strain: None     Food insecurity     Worry: None      Inability: None     Transportation needs     Medical: None     Non-medical: None   Tobacco Use     Smoking status: Never Smoker     Smokeless tobacco: Never Used   Substance and Sexual Activity     Alcohol use: Yes     Alcohol/week: 4.0 standard drinks     Types: 4 Standard drinks or equivalent per week     Drug use: Never     Sexual activity: None   Lifestyle     Physical activity     Days per week: None     Minutes per session: None     Stress: None   Relationships     Social connections     Talks on phone: None     Gets together: None     Attends Yarsani service: None     Active member of club or organization: None     Attends meetings of clubs or organizations: None     Relationship status: None     Intimate partner violence     Fear of current or ex partner: None     Emotionally abused: None     Physically abused: None     Forced sexual activity: None   Other Topics Concern     None   Social History Narrative     None       ALLERGIES:   No Known Allergies      HOME MEDICATIONS:     Prior to Admission medications    Not on File       CURRENT MEDICATIONS:    Current Facility-Administered Medications   Medication     [START ON 9/3/2020] acetaminophen (TYLENOL) tablet 650 mg     acetaminophen (TYLENOL) tablet 975 mg     aspirin EC tablet 162 mg     bisacodyl (DULCOLAX) Suppository 10 mg     ceFAZolin (ANCEF) intermittent infusion 2 g in 100 mL dextrose PRE-MIX     gabapentin (NEURONTIN) capsule 100 mg     HYDROmorphone (DILAUDID) injection 0.2-0.4 mg     ketorolac (TORADOL) injection 30 mg     lactated ringers infusion     lidocaine (LMX4) cream     lidocaine 1 % 0.1-1 mL     naloxone (NARCAN) injection 0.1-0.4 mg     ondansetron (ZOFRAN-ODT) ODT tab 4 mg    Or     ondansetron (ZOFRAN) injection 4 mg     oxyCODONE (ROXICODONE) tablet 5 mg     oxyCODONE (ROXICODONE) tablet 5-10 mg     polyethylene glycol (MIRALAX) Packet 17 g     senna-docusate (SENOKOT-S/PERICOLACE) 8.6-50 MG per tablet 2 tablet     sodium  "chloride (PF) 0.9% PF flush 3 mL     sodium chloride (PF) 0.9% PF flush 3 mL         ROS: 10 point ROS neg other than the symptoms noted above in the HPI.    PHYSICAL EXAMINATION:     /86 (BP Location: Right arm)   Pulse 83   Temp 98  F (36.7  C) (Oral)   Resp 15   Ht 1.854 m (6' 1\")   Wt 93.8 kg (206 lb 12.7 oz)   SpO2 93%   BMI 27.28 kg/m    Temp (24hrs), Av  F (36.7  C), Min:97.7  F (36.5  C), Max:98.3  F (36.8  C)      BMI= Body mass index is 27.28 kg/m .      Intake/Output Summary (Last 24 hours) at 2020 0022  Last data filed at 2020 2237  Gross per 24 hour   Intake 2100 ml   Output 1375 ml   Net 725 ml       General: Alert, interactive, NAD.   HEENT: AT/NC. Anicteric.Moist MM.    Neck: Supple.   Heart/CVS: RRR  Chest/Respi: Non labored breathing.   Abdomen/GI: Soft, non distended, non tender. No g/r.  Extremities/MSK: Distal pulses 2+, well perfused. Rest per ortho.   Neuro: Alert and oriented x4.   Skin:  No new rash over exposed areas.       LABORATORY DATA: reviewed.     Recent Results (from the past 24 hour(s))   Glucose by meter    Collection Time: 20 12:03 PM   Result Value Ref Range    Glucose 74 70 - 99 mg/dL   Creatinine    Collection Time: 20  1:27 PM   Result Value Ref Range    Creatinine 1.26 (H) 0.66 - 1.25 mg/dL    GFR Estimate 64 >60 mL/min/[1.73_m2]    GFR Estimate If Black 74 >60 mL/min/[1.73_m2]       Recent Results (from the past 24 hour(s))   POC US Guidance Needle Placement    Narrative    Adductor canal block   XR Surgery BRYANNA L/T 5 Min Fluoro    Narrative    This exam was marked as non-reportable because it will not be read by a   radiologist or a [place] non-radiologist provider.         XR Knee Port Left 1/2 Views    Narrative    EXAM: XR KNEE PORT LT 1/2 VW  LOCATION: [facility]  DATE/TIME: 2020 10:32 PM    INDICATION: Status post knee surgery  COMPARISON: 2020      Impression    IMPRESSION: Status post revision left TKA. " Surgical drain in place. No dislocation. Joint effusion, soft tissue edema and emphysema.           Hermilo Macario MD

## 2020-09-01 NOTE — PHARMACY-ADMISSION MEDICATION HISTORY
Admission Medication History Completed by Pharmacy    See [facility] Admission Navigator for allergy information, preferred outpatient pharmacy, prior to admission medications and immunization status.     Medication History Sources:     Patient and no fill history available.     Changes made to PTA medication list (reason):    Added: None    Deleted: None    Changed: None    Additional Information:    Patient states he is not taking any prescription, OTC or vitamins and supplements at this time.     Prior to Admission medications    Medication Sig Last Dose Taking? Auth Provider       Date completed: 09/01/20    Medication history completed by: Daniel Armendariz RPH

## 2020-09-01 NOTE — PLAN OF CARE
VS: Vital signs:  Temp: 98.3  F (36.8  C) Temp src: Oral BP: 98/64 Pulse: 87   Resp: 24 SpO2: 94 % O2 Device: None (Room air)      O2: >90% on room air    Output: Patient able to void, bladder scan of 0 this shift    Last BM: 9/1/2020   Activity: WBAT LLE. SBA with walker    Skin: Incision to left leg. ACE wrap removed for skin assessment.    Pain: Patient denies pain    CMS: Denies numbness and tingling    Dressing: Dressing c/d/i. Drain with serosanguinous output. CRISTINA drain empty multiple times this shift, Ortho NP (Nicolasa) notified. Will continue to monitor    Diet: Regular diet. Good appetite    LDA: CRISTINA drain, PIV saline locked    Equipment: Walker, gait belt, IV pole and pump.    Plan: TBD   Additional Info:

## 2020-09-01 NOTE — PROGRESS NOTES
09/01/20 0800   Quick Adds   Type of Visit Initial PT Evaluation       Present no   Language English   Living Environment   Lives With spouse   Living Arrangements house   Home Accessibility stairs to enter home   Number of Stairs, Main Entrance 3   Stair Railings, Main Entrance railings on both sides of stairs   Transportation Anticipated car, drives self   Self-Care   Usual Activity Tolerance good   Current Activity Tolerance moderate   Regular Exercise Yes   Activity/Exercise Type biking;walking   Exercise Amount/Frequency 45 mins;3-5 times/wk   Equipment Currently Used at Home shower chair   Functional Level Prior   Ambulation 0-->independent   Transferring 0-->independent   Toileting 0-->independent   Bathing 0-->independent   Communication 0-->understands/communicates without difficulty   Swallowing 0-->swallows foods/liquids without difficulty   Cognition 0 - no cognition issues reported   Fall history within last six months no   Number of times patient has fallen within last six months 0   Which of the above functional risks had a recent onset or change? ambulation;transferring   General Information   Onset of Illness/Injury or Date of Surgery - Date 08/31/20   Referring Physician Checo Gonzalez   Patient/Family Goals Statement Return to home to painfree activity   Pertinent History of Current Problem (include personal factors and/or comorbidities that impact the POC) Meliton Mortensen is a 54 year old male admitted on 8/31/2020 s/p following procedure: Left revision total knee arthroplasty for aseptic loosening.   Precautions/Limitations fall precautions   Weight-Bearing Status - LUE weight-bearing as tolerated   Weight-Bearing Status - RUE full weight-bearing   Weight-Bearing Status - LLE full weight-bearing   Weight-Bearing Status - RLE full weight-bearing   General Observations Supine in bed upon arrival, agreeable to PT   General Info Comments Capno, pulse oximetry     Cognitive Status Examination   Orientation orientation to person, place and time   Level of Consciousness alert   Follows Commands and Answers Questions 100% of the time   Personal Safety and Judgment intact   Memory intact   Pain Assessment   Patient Currently in Pain Yes, see Vital Sign flowsheet   Integumentary/Edema   Integumentary/Edema Comments Wound not obseved secondary to ACE wrap   Posture    Posture Protracted shoulders   Range of Motion (ROM)   ROM Quick Adds Knee, Left   Left Knee Extension/Flexion ROM   Left Knee Extension/Flexion AROM - degrees 95 flexion   Strength   Strength Comments Not formally assessed, strong functional mobility    Bed Mobility   Bed Mobility Comments Supine<>sit on flat bed without bedrails independently.    Transfer Skills   Transfer Comments Sit<>stand without AD independently   Gait   Gait Comments Ambulated 200' with FWW and supervision, 200' without FWW and with supervison   Balance   Balance Comments Not formally assessed, steady balance seated EOB and standing   Sensory Examination   Sensory Perception Comments Not formally assessed, pt reported no loss of sensation   General Therapy Interventions   Planned Therapy Interventions strengthening;ROM;gait training   Clinical Impression   Criteria for Skilled Therapeutic Intervention no problems identified which require skilled intervention;yes, treatment indicated   PT Diagnosis Decreased strength, decreased ROM, and impaired functional mobility.    Influenced by the following impairments s/p TKA revision, decreased ROM   Functional limitations due to impairments Impaired gait   Clinical Presentation Stable/Uncomplicated   Clinical Presentation Rationale Meliton Mortensen is a 54 year old male admitted on 8/31/2020 s/p following procedure: Left revision total knee arthroplasty for aseptic loosening.  Pt was independent at baseline, does not have any co-morbidities that will impact PT POC.    Clinical Decision Making  "(Complexity) Low complexity   Therapy Frequency Other (see comments)  (One time eval and treat. )   Predicted Duration of Therapy Intervention (days/wks) 1   Anticipated Equipment Needs at Discharge front wheeled walker  (pt declined FWW, feels comfortable ambulating without AD)   Anticipated Discharge Disposition Home with Assist;Home with Outpatient Therapy   Risk & Benefits of therapy have been explained Yes   Patient, Family & other staff in agreement with plan of care Yes   [facility] [employer] TM \"6 Clicks\"   2016, Trustees of [facility], under license to [employer].  All rights reserved.   6 Clicks Short Forms Basic Mobility Inpatient Short Form   [facility] AM-PAC  \"6 Clicks\" V.2 Basic Mobility Inpatient Short Form   1. Turning from your back to your side while in a flat bed without using bedrails? 4 - None   2. Moving from lying on your back to sitting on the side of a flat bed without using bedrails? 4 - None   3. Moving to and from a bed to a chair (including a wheelchair)? 4 - None   4. Standing up from a chair using your arms (e.g., wheelchair, or bedside chair)? 4 - None   5. To walk in hospital room? 4 - None   6. Climbing 3-5 steps with a railing? 4 - None   Basic Mobility Raw Score (Score out of 24.Lower scores equate to lower levels of function) 24   Total Evaluation Time   Total Evaluation Time (Minutes) 5     "

## 2020-09-01 NOTE — PROGRESS NOTES
[facility]    Medicine Progress Note - Hospitalist Service       Date of Admission:  8/31/2020  Assessment & Plan The email address for your [employer] account has been updated     Meliton Mortensen is a 54 year old male admitted on 8/31/2020 s/p following procedure:    Left revision total knee arthroplasty for aseptic loosening.  Estimated blood loss 600 cc.       Activity, antibiotics, wound and/or drain care - per Primary team.   Anemia of acute blood loss: Pre op Hgb 16.1. today hb 12.8 . Clinically insignificant     Monitor hgb for anemia of acute blood loss. 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    Solitary kidney.  Preop creatinine 1.4.  Creatinine 91 is 1.33 . Potassium 4.6    Monitor closely.  Avoid nephrotoxic's.    Renal dosing.    Follow-up BMP.  Avoid NSAIDs          Diet: Advance Diet as Tolerated: Regular Diet Adult    DVT Prophylaxis: Defer to primary service  Whitley Catheter: in place, indication:    Code Status: Full Code           The patient's care was discussed with the Bedside Nurse, Care Coordinator/, Patient, Patient's Family and Primary team.    Elda Huber MD  Hospitalist Service  [facility]    ______________________________________________________________________    Interval History   No new issues  Bloody drainage   Pain controlled  No cp   No sob   Feels well over all   Wife by bedside    Data reviewed today: I reviewed all medications, new labs and imaging results over the last 24 hours.    Physical Exam   Vital Signs: Temp: 98.3  F (36.8  C) Temp src: Oral BP: 98/64 Pulse: 87   Resp: 24 SpO2: 94 % O2 Device: None (Room air) Weight: 206 lbs 12.66 oz  Constitutional: awake, alert, cooperative, no apparent distress, and appears stated age  Eyes: Lids and lashes normal, pupils equal, round and reactive to light, extra ocular muscles intact, sclera  clear, conjunctiva normal  ENT: Normocephalic, without obvious abnormality, atraumatic, sinuses nontender on palpation, external ears without lesions, oral pharynx with moist mucous membranes, tonsils without erythema or exudates, gums normal and good dentition.  Hematologic / Lymphatic: no cervical lymphadenopathy  Respiratory: No increased work of breathing, good air exchange, clear to auscultation bilaterally, no crackles or wheezing  Cardiovascular: Normal apical impulse, regular rate and rhythm, normal S1 and S2, no S3 or S4, and no murmur noted  GI: No scars, normal bowel sounds, soft, non-distended, non-tender, no masses palpated, no hepatosplenomegally    Data   Recent Labs   Lab 09/01/20  0515 08/31/20  1327 08/27/20  1607   WBC  --   --  7.8   HGB 12.8*  --  16.1   MCV  --   --  90   PLT  --   --  184     --  139   POTASSIUM 4.6  --  4.9   CHLORIDE 106  --  106   CO2 27  --  31   BUN 19  --  27   CR 1.33* 1.26* 1.42*   ANIONGAP 7  --  2*   DAVID 8.2*  --  9.8   *  --  93

## 2020-09-01 NOTE — PLAN OF CARE
VS: VSS, capno monitoring   O2: Room air, maintaining sats >90 this shift   Output: Whitley in place, to take out in AM at 0600. Pt. Will be DTV   Last BM: 8/31/2020 (before surgery)   Activity: Assist x1, walker, gait belt. Dangled at bedside, stood and took a few steps in the room tonight.   WBAT LLE   Skin: Incision to LLE   Pain: Managed with scheduled tylenol, toradol, and ice   CMS: Some numbness LLE   Dressing: ACE CDI, checked skin around bandage   Diet: Regular diet as tolerated by pt. Denies nausea.    LDA: Right PIV infusing   Equipment: IV pole, walker, gait belt, pillows, PCDs   Plan: Continue plan of care   Additional Info: Pt. Arrived to unit around 2300. Pt. Was oriented to the room. Pt. Is A&Ox4.

## 2020-09-01 NOTE — OP NOTE
Preop diagnosis: 1.  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  2.  Retained hardware from previous ACL reconstruction    Postoperative diagnosis: Same    Procedures performed: 1.  Revision of hinged knee femoral prosthesis.  2.  Removal of hardware, screw from prior ACL surgery    Surgeons Clay Decker and Checo Gonzalez    Pathology submitted: Tissue left knee x3 for aerobic and anaerobic cultures.      Estimated blood loss: 600 cc    Patient was interviewed in the preoperative area risk and benefits have been reviewed.  The surgical site was marked with my initials and line of intended incision.  Preoperative brief had been performed.  He was taken to the operating room in the supine position the left lower extremity was prepped and draped sterilely.  Surgical timeout was performed.    The prior incision was used in the anterior aspect of the left knee this was approximately 10 inches in length.  It was a direct anterior approach.  The medial retinaculum was incised as was the distal portion of the quadriceps tendon.  The knee was found to be full of robust villous appearing synovium.  This was removed under direct visualization which took about 45 minutes.  In a standard fashion then the hinge was removed as were the bushings.  The femur was dislocated the rotating platform and tibial spacer were removed and the cylinder inside the tibial tray was removed.  The femur was then removed with the use of a drift and a mallet.  The cement was taken off of the anterior surface of the femur.  The posterior capsule area was aggressively debrided to facilitate visualization.    We then used C-arm to identify a centrally located point of penetration in the pedestal of the distal femur at the level of the most proximal portion of the previous implant.  This was ultimately penetrated in excellent positioning and the femur was hand reamed to 21 mm which was thought to be very stenotic.    We then placed the  cutting jig and a 2 in the neutral position to freshen up our cuts.  There was a small portion of bone that was taken off in the anterior posterior and chamfer cuts plus the anterior cut on the face of the femur.    A trial reduction was then performed using a medium femoral prosthesis and a 21 x 155 insert.  X-rays were taken this looked excellent with good alignment and good position of the intramedullary compression.    I do then prepared to place a final implant it was determined that the previous ACL screw was blocking placement of the stem.  We therefore used the AGC  screw removal kit and were ultimately able to remove the ACL screw from the central portion of the distal femur.    The wound was then copiously irrigated a standard femoral knee component left was used plus a 155 x 21 mm fluted stem.  A reduction was done with 20 mm tibial tray in the standard other portions of the hinged knee were placed without difficulty.    The knee was examined the patella tracked into full extension the knee was closed with fascial subcutaneous and skin layers after placement of a drain    Postoperative debrief was performed.    Postoperative plan 1.  Standard total knee physical therapy and anticoagulation protocol.  2.  We will follow-up on the cultures.  3.  He should return to clinic in 2 to 3 weeks to see myself or

## 2020-09-01 NOTE — PLAN OF CARE
Discharge Planner PT   Patient plan for discharge: home with assist  Current status: Pt seated in bed at arrival, agreeable to PT. Supine<>sit independently, bed flat, no bed rail. Sit<>stand independent. Seated balance steady without UE. Standing balance steady without AD. Pt ambulated 200' with FWW and supervision, 200' without FWW with supervision. Ascended/descended 3 stairs x 2 with bilateral railings. Pt educated on knee exercises, performed x 10. Pt steady and displayed good strength throughout. Pt does not have walker at home, declined walker for home use. States feels steady walking without AD.  Barriers to return to prior living situation: No barriers per PT needs  Recommendations for discharge: home with assist and OP PT  Rationale for recommendations: Skilled PT to improve strength and ROM and help normalize gait pattern       Entered by: Diony Cabrera 09/01/2020 9:44 AM      Physical Therapy Discharge Summary    Reason for therapy discharge: PT needs met, safe to return home with assist    Progress towards therapy goal(s). See goals on Care Plan in [facility] electronic health record for goal details.    Therapy recommendation(s):  Home with assist and OP PT to improve ROM and strength and help normalize gait pattern

## 2020-09-02 LAB — HGB BLD-MCNC: 11.8 G/DL (ref 13.3–17.7)

## 2020-09-02 PROCEDURE — 12000001 ZZH R&B MED SURG/OB UMMC

## 2020-09-02 PROCEDURE — 36415 COLL VENOUS BLD VENIPUNCTURE: CPT | Performed by: STUDENT IN AN ORGANIZED HEALTH CARE EDUCATION/TRAINING PROGRAM

## 2020-09-02 PROCEDURE — 85018 HEMOGLOBIN: CPT | Performed by: STUDENT IN AN ORGANIZED HEALTH CARE EDUCATION/TRAINING PROGRAM

## 2020-09-02 PROCEDURE — 25000132 ZZH RX MED GY IP 250 OP 250 PS 637: Performed by: STUDENT IN AN ORGANIZED HEALTH CARE EDUCATION/TRAINING PROGRAM

## 2020-09-02 PROCEDURE — 99231 SBSQ HOSP IP/OBS SF/LOW 25: CPT | Performed by: INTERNAL MEDICINE

## 2020-09-02 RX ADMIN — GABAPENTIN 100 MG: 100 CAPSULE ORAL at 07:33

## 2020-09-02 RX ADMIN — ACETAMINOPHEN 975 MG: 325 TABLET, FILM COATED ORAL at 23:20

## 2020-09-02 RX ADMIN — DOCUSATE SODIUM AND SENNOSIDES 2 TABLET: 8.6; 5 TABLET ORAL at 07:32

## 2020-09-02 RX ADMIN — ACETAMINOPHEN 975 MG: 325 TABLET, FILM COATED ORAL at 07:32

## 2020-09-02 RX ADMIN — OXYCODONE HYDROCHLORIDE 5 MG: 5 TABLET ORAL at 03:29

## 2020-09-02 RX ADMIN — GABAPENTIN 100 MG: 100 CAPSULE ORAL at 20:06

## 2020-09-02 RX ADMIN — ACETAMINOPHEN 975 MG: 325 TABLET, FILM COATED ORAL at 14:30

## 2020-09-02 RX ADMIN — GABAPENTIN 100 MG: 100 CAPSULE ORAL at 14:30

## 2020-09-02 RX ADMIN — OXYCODONE HYDROCHLORIDE 5 MG: 5 TABLET ORAL at 13:54

## 2020-09-02 RX ADMIN — ASPIRIN 162 MG: 81 TABLET, COATED ORAL at 07:33

## 2020-09-02 RX ADMIN — OXYCODONE HYDROCHLORIDE 5 MG: 5 TABLET ORAL at 23:21

## 2020-09-02 RX ADMIN — POLYETHYLENE GLYCOL 3350 17 G: 17 POWDER, FOR SOLUTION ORAL at 07:32

## 2020-09-02 NOTE — PLAN OF CARE
VS: Temp: 98.6  F (37  C) Temp src: Oral BP: 122/70 Pulse: 81   Resp: 16 SpO2: 94 % O2 Device: None (Room air)       O2: >90% on room air    Output: Voiding spontaneously without difficulties    Last BM: 9/2/2020. Per pt. He had a small BM this morning    Activity: WBAT. SBA with gait belt and walker    Skin: Incision to left knee, bruises.    Pain: Pain well managed with schedule tylenol and Gabapentin.  PRN oxy 5mg given x1.    CMS: Denies numbness and tingling    Dressing: CDI   Diet: Regular diet. Good appetite    LDA: PIV saline locked. CRISTINA drain removed    Equipment: Walker, gait belt, personal belongings, call light within patient's reach   Plan: Possible discharge to home tomorrow. Continue with POC   Additional Info:

## 2020-09-02 NOTE — PROGRESS NOTES
"Orthopedic Surgery Progress Note   September 1, 2020    Subjective: No acute events overnight. Pain very well controlled still. Doing well with therapies. PT/OT recommending discharge home. Eating well, no numbness/tingling in operative extremity.     Objective: /65 (BP Location: Left arm)   Pulse 81   Temp 98.8  F (37.1  C) (Oral)   Resp 16   Ht 1.854 m (6' 1\")   Wt 93.8 kg (206 lb 12.7 oz)   SpO2 96%   BMI 27.28 kg/m      Drain: 25 -> 70cc last 2 shifts    General: NAD, alert and oriented, cooperative with exam.   Cardio: RRR, extremities wwp.   Respiratory: Non-labored breathing.  MSK: Focused examination of LLE: Toes wwp, DP 2+ and PT 2+, bcr in all toes. +EHL/FHL/GSC/TA with 5/5 strength. SILT SP/DP/Sa/Esquivel/T. Dressing c/d/i. Drain with serosanguinous output.    Labs:   Hgb 11.8  Cultures: NGTD x2 days    Imaging: PACU knee XR reviewed, no obvious fractures. Interval revision of femoral component of hinged knee. Drain in place.     Assessment and Plan:   Assessment/Plan: Meliton Mortensen is a 54 year old male s/p Revision Left Hinged TKA on 8/31/2020 with Dr. Decker. Doing very well.      Activity: Up with assist.  Weight bearing status: WBAT LLE, ROMAT.  Antibiotics: Ancef x 24 hours.  Diet: Begin with clear fluids and progress diet as tolerated.  DVT prophylaxis: ASA 162mg daily and mechanical while in the hospital, discharge on ASA 162mg daily x 4 weeks.  Bracing/Splinting: None  Elevation: Elevate LLE on pillows.  Wound Care: Keep Tegaderm/Alginate dressing in place x7 days  Drains: CRISTINA drain x1, document output per shift, discontinue prior to discharge.  Pain management: transition from IV to orals as tolerated.  X-rays: PACU  Physical Therapy: Eval and Treat.  Occupational Therapy: Eval and Treat  Labs: Trend Hgb on POD #1, 2  Cultures: Intraop Cultures taken x3, follow culture results (infection not suspected pre-op or intraop). NGTD x2 days  Consults: PT, OT, Medicine, appreciate assistance " in caring for this patient.  Follow-up: Clinic with Dr. Decker's team in 2 weeks for wound check.     Disposition: Anticipate discharge to home tomorrow 9/3.    Checo Gonzalez MD  Orthopedic Surgery PGY-4  Pager: [phone]

## 2020-09-02 NOTE — PROGRESS NOTES
"    [facility]    Medicine Progress Note - Hospitalist Service       Date of Admission:  8/31/2020  Assessment & Plan The email address for your [employer] account has been updated     The email address for your [employer] account has been updated     Meliton Mortensen is a 54 year old male admitted on 8/31/2020 s/p following procedure:     Left revision total knee arthroplasty for aseptic loosening.   Activity, antibiotics, wound and/or drain care - per Primary team.   Anemia of acute blood loss: Pre op Hgb 16.1. today hb 12.8 . Clinically insignificant      Monitor hgb for anemia of acute blood loss. 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    Solitary kidney.  Preop creatinine 1.4.  Creatinine 91 is 1.33 . Potassium 4.6     Monitor closely.  Avoid nephrotoxic's.    Renal dosing.    Follow-up BMP.  Avoid NSAIDs    The patient's care was discussed with the Bedside Nurse, Care Coordinator/, Patient and Primary team.    Elda Huber MD  Hospitalist Service  [facility]    ______________________________________________________________________    No new issues reported per patient or nursing staff   No cp   No sob   No fever   No chills  No nausea  No vomiting   No loss of consciousness        Vital Signs:  Temp: 98.6  F (37  C) Temp src: Oral BP: 122/70 Pulse: 81   Resp: 16 SpO2: 94 % O2 Device: None (Room air) Oxygen Delivery: 2 LPM Height: 185.4 cm (6' 1\") Weight: 93.8 kg (206 lb 12.7 oz)  Estimated body mass index is 27.28 kg/m  as calculated from the following:    Height as of this encounter: 1.854 m (6' 1\").    Weight as of this encounter: 93.8 kg (206 lb 12.7 oz).  PERR, EOMI  Neck ; supple. No JVD . No Thyromegaly  Chest ; clear bilaterally , no rales , or rhonchi   CVS ; RRR, no m/r/g . S1 and S2.   GI ; soft abdomen , non tender , BS positive.   Psych ; appropriate mood and affect " .  Neuro ; alert and oriented .No facial asymmetry . No pronator drift ,   Skin ; no rash or purpura seen on exposed area      ROUTINE IP LABS (Last four results)  BMP  Recent Labs   Lab 09/01/20  0515 08/31/20  1327 08/27/20  1607     --  139   POTASSIUM 4.6  --  4.9   CHLORIDE 106  --  106   DAVID 8.2*  --  9.8   CO2 27  --  31   BUN 19  --  27   CR 1.33* 1.26* 1.42*   *  --  93     CBC  Recent Labs   Lab 09/02/20  0522 09/01/20  0515 08/27/20  1607   WBC  --   --  7.8   RBC  --   --  5.24   HGB 11.8* 12.8* 16.1   HCT  --   --  47.3   MCV  --   --  90   MCH  --   --  30.7   MCHC  --   --  34.0   RDW  --   --  13.1   PLT  --   --  184     INRNo lab results found in last 7 days.

## 2020-09-02 NOTE — PLAN OF CARE
[id number]  VS: VSS   O2: >90% on RA   Output: Adequate UOP   Last BM: 9/2; +fl   Activity: WBAT; SBA   Up for meals? Yes   Skin: Incision L knee   Pain: 5mg oxycodone q4h   CMS: Intact   Dressing: Alginate + ACE   Diet: Regular   LDA: L L FA PIV SL   Equipment: PCDs, FWW   Plan: Home tomorrow   Additional Info: CRISTINA out today

## 2020-09-02 NOTE — PLAN OF CARE
VS: VSS   O2: Room air, maintaining sats >90 this shift   Output: Voiding adequate amounts   Last BM: 9/1/2020   Activity: Assist x1, walker, gait belt  WBAT LLE   Skin: Incision LLE   Pain: Managed with prn po oxycodone 5mg and scheduled tyelnol   CMS: Denies numbness/tingling   Dressing: ACE LLE CDI, unwrapped ACE this shift (skin intact)   Diet: Regular diet as tolerated by pt. Denies nausea.    LDA: Left PIV SL, CRISTINA drain (to bulb suction, 70ml output this shift)   Equipment: Walker, IV pole, pillows, pcds   Plan: Continue plan of care   Additional Info: Pt. Is A&Ox4

## 2020-09-02 NOTE — PROGRESS NOTES
Patient interviewed and examined. Consider discontinue tomorrow. I agree with Dr Da Silva's assessment and plan.

## 2020-09-03 ENCOUNTER — PATIENT OUTREACH (OUTPATIENT)
Dept: CARE COORDINATION | Facility: CLINIC | Age: 55
End: 2020-09-03

## 2020-09-03 VITALS
BODY MASS INDEX: 27.41 KG/M2 | SYSTOLIC BLOOD PRESSURE: 116 MMHG | TEMPERATURE: 98.3 F | HEIGHT: 73 IN | RESPIRATION RATE: 16 BRPM | HEART RATE: 84 BPM | DIASTOLIC BLOOD PRESSURE: 75 MMHG | OXYGEN SATURATION: 96 % | WEIGHT: 206.79 LBS

## 2020-09-03 PROCEDURE — 25000132 ZZH RX MED GY IP 250 OP 250 PS 637: Performed by: STUDENT IN AN ORGANIZED HEALTH CARE EDUCATION/TRAINING PROGRAM

## 2020-09-03 RX ADMIN — ACETAMINOPHEN 975 MG: 325 TABLET, FILM COATED ORAL at 07:57

## 2020-09-03 RX ADMIN — DOCUSATE SODIUM AND SENNOSIDES 2 TABLET: 8.6; 5 TABLET ORAL at 07:57

## 2020-09-03 RX ADMIN — POLYETHYLENE GLYCOL 3350 17 G: 17 POWDER, FOR SOLUTION ORAL at 07:56

## 2020-09-03 RX ADMIN — ASPIRIN 162 MG: 81 TABLET, COATED ORAL at 07:57

## 2020-09-03 RX ADMIN — GABAPENTIN 100 MG: 100 CAPSULE ORAL at 07:57

## 2020-09-03 RX ADMIN — OXYCODONE HYDROCHLORIDE 5 MG: 5 TABLET ORAL at 07:57

## 2020-09-03 NOTE — PLAN OF CARE
Patient A/Ox4, very pleasant. VSS. Denies CP, SOB, dizziness/LH. LSCTA. +fl/BS. Voiding well in bathroom. CMS intact. Dressing to knee CDI, ice applied periodically. Tolerating regular diet without NV. Activity level is good, pt walking in room. IV SL. Pain rated as comfortably managed throughout shift, pt only taking pain medications sporadically. Likely discharge today. Patient has demonstrated ability to call appropriately. Patient is resting with call light within reach. Will continue to monitor.

## 2020-09-03 NOTE — PROGRESS NOTES
"Orthopedic Surgery Progress Note   September 1, 2020    Subjective: No acute events overnight. Pain well controlled. Doing his leg lift exercises and knee ROM regularly throughout the day. Ready to discharge home today. No concerns for ortho this morning.     Objective: /78 (BP Location: Left arm)   Pulse 84   Temp 98.5  F (36.9  C) (Oral)   Resp 18   Ht 1.854 m (6' 1\")   Wt 93.8 kg (206 lb 12.7 oz)   SpO2 98%   BMI 27.28 kg/m        General: NAD, alert and oriented, cooperative with exam.   Cardio: RRR, extremities wwp.   Respiratory: Non-labored breathing.  MSK: Focused examination of LLE: Toes wwp, DP 2+ and PT 2+, bcr in all toes. +EHL/FHL/GSC/TA with 5/5 strength. SILT SP/DP/Sa/Esquivel/T. Dressing c/d/i.    Labs:   Cultures: NGTD x3 days    Imaging: PACU knee XR reviewed, no obvious fractures. Interval revision of femoral component of hinged knee. Drain in place.     Assessment and Plan:   Assessment/Plan: Meliton Mortensen is a 54 year old male s/p Revision Left Hinged TKA on 8/31/2020 with Dr. Decker. Doing very well.      Activity: Up with assist.  Weight bearing status: WBAT LLE, ROMAT.  Antibiotics: Ancef x 24 hours.  Diet: Begin with clear fluids and progress diet as tolerated.  DVT prophylaxis: ASA 162mg daily and mechanical while in the hospital, discharge on ASA 162mg daily x 4 weeks.  Bracing/Splinting: None  Elevation: Elevate LLE on pillows.  Wound Care: Keep Tegaderm/Alginate dressing in place x7 days  Drains: Removed 9/2  Pain management: transition from IV to orals as tolerated.  X-rays: PACU  Physical Therapy: Eval and Treat.  Occupational Therapy: Eval and Treat  Labs: Trend Hgb on POD #1, 2  Cultures: Intraop Cultures taken x3, follow culture results (infection not suspected pre-op or intraop). NGTD x2 days  Consults: PT, OT, Medicine, appreciate assistance in caring for this patient.  Follow-up: Clinic with Dr. Decker's team in 2 weeks for wound check.     Disposition: Discharge " home today 9/3    Checo Gonzalez MD  Orthopedic Surgery PGY-4  Pager: [phone]

## 2020-09-03 NOTE — PLAN OF CARE
VS: VSS   O2: >90% on room air    Output: Voiding spontaneously without difficulties    Last BM: 9/2/2020   Activity: WBAT. SBA   Skin: Incision to left knee, bruises    Pain: Managed with schedule tylenol and PRN oxy    CMS: Denies numbness and tingling    Dressing: CDI   Diet: Regular diet. Good appetite    LDA: PIV saline locked    Equipment: PCDs, walker    Plan: Discharge to home today    Additional Info:      Pt. discharged at 0850  to Home. Pt. was accompanied by Spouse, and left with personal belongings. Prior to discharge, PIV was removed. Pt. received complete discharge paperwork and medications as filled by discharge pharmacy. Pt. was given times of last dose for all discharge medications in writing on discharge medication sheets.  Discharge teaching included medication, pain management, activity restrictions, dressing changes, and signs and symptoms of infection. Pt. to follow up with surgeon in 2 weeks. Pt. had no further questions at the time of discharge and no unmet needs were identified.

## 2020-09-03 NOTE — PROGRESS NOTES
"Patient was medically stable to discharge  He discharged even before I had a chance to see him  No acute events overnight  /75 (BP Location: Left arm)   Pulse 84   Temp 98.3  F (36.8  C) (Oral)   Resp 16   Ht 1.854 m (6' 1\")   Wt 93.8 kg (206 lb 12.7 oz)   SpO2 96%   BMI 27.28 kg/m     labs reviewed. Stable    Dr MIRI Gale MD, [facility]  Hospitalist ( Internal medicine)  Pager: [phone]    "

## 2020-09-03 NOTE — DISCHARGE SUMMARY
ORTHOPAEDIC SURGERY DISCHARGE SUMMARY     Date of Admission: 8/31/2020  Date of Discharge: 9/3/2020  8:54 AM  Disposition: Home  Staff Physician: Dr. 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 Seen in clinic recently with symptoms and imaging consistent with femoral component aseptic loosening. Labs normal and low suspicion for infectious cause. Discussed revision surgery and patient elected to proceed.     HOSPITAL COURSE:    The patient was admitted following the above listed procedures for pain control and rehabilitation. Meliton Mortensen did well post-operatively. Medicine was consulted post operatively to aid in management of medical co-morbidities. The patient received routine nursing cares and at the time of discharge was medically stable. Vital signs were stable throughout admission. The patient is tolerating a regular diet and is voiding spontaneously. All PT/OT goals have been met for safe mobility. Pain is now controlled on oral medications which will be available on discharge. Stool softeners have been used while taking pain medications to help prevent constipation. Meliton Mortensen is deemed medically safe to discharge.     Antibiotics:  Ancef given periop and 24 hours postop.  DVT prophylaxis:  ASA 162mg daily initiated after surgery and will be continued for 4 weeks.   PT Progress:  Has met PT/OT goals for safe mobility.   Pain Meds:  Weaned off all IV pain meds by discharge.  Inpatient Events: No significant events or complications.    PHYSICAL EXAM:    General: NAD, alert and oriented, cooperative with exam.   Cardio: RRR, extremities wwp.   Respiratory: Non-labored breathing.  MSK: Focused  examination of LLE: Toes wwp, DP 2+ and PT 2+, bcr in all toes. +EHL/FHL/GSC/TA with 5/5 strength. SILT SP/DP/Sa/Esquivel/T. Dressing c/d/i.    FOLLOWUP:    Follow up with Dr. Decker at 2 weeks postoperatively.    Future Appointments   Date Time Provider Department Center   9/17/2020  2:45 PM Mike Decker MD [facility]       Orthopaedic Surgery appointments are at the [facility] ([address] [place] [address]). Call [phone] to schedule a follow-up appointment at this location with your provider.     PLANNED DISCHARGE ORDERS:      Discharge Medication List as of 9/3/2020  8:21 AM      START taking these medications    Details   acetaminophen (TYLENOL) 325 MG tablet Take 2 tablets (650 mg) by mouth every 4 hours as needed for other, Disp-1 Bottle,R-0, E-PrescribePossible discharge Tuesday or wednesday      aspirin (ASA) 81 MG EC tablet Take 2 tablets (162 mg) by mouth daily, Disp-28 tablet,R-0, E-Prescribe      oxyCODONE (ROXICODONE) 5 MG tablet Take 1-2 tablets (5-10 mg) by mouth every 3 hours as needed, Disp-30 tablet,R-0, E-Prescribe      polyethylene glycol (MIRALAX) 17 g packet Take 17 g by mouth daily, Disp-7 packet,R-0, E-Prescribe      senna-docusate (SENOKOT-S/PERICOLACE) 8.6-50 MG tablet Take 2 tablets by mouth 2 times daily, Disp-30 tablet,R-0, E-Prescribe               Discharge Procedure Orders   Reason for your hospital stay   Order Comments: s/p Revision Left Hinged TKA on 8/31/2020 with Dr. Decker     Activity   Order Comments: Your activity upon discharge: activity as tolerated    Activity: Up with assist.  Weight bearing status: WBAT LLE, ROMAT.     Order Specific Question Answer Comments   Is discharge order? Yes      Adult [facility] Follow-up and recommended labs and tests   Order Comments: Follow-up: Clinic with Dr. Decker's team in 2 weeks for wound check    Appointments on [place] and/or [facility] (with [facility] or [facility] provider or service).  Call [phone] if you haven't heard regarding these appointments within 7 days of discharge.     When to contact your care team   Order Comments: Call Dr Decker  if you have any of the following: temperature greater than 101.5  increased shortness of breath, increased drainage, increased swelling or increased pain.     Wound care and dressings   Order Comments: Instructions to care for your wound at home: as directed, ice to area for comfort, keep wound clean and dry, may get incision wet in shower but do not soak or scrub and reinforce dressing as needed    Okay to remove dressing on post-op day # 7     Diet   Order Comments: Follow this diet upon discharge: Orders Placed This Encounter      Advance Diet as Tolerated: Regular Diet Adult     Order Specific Question Answer Comments   Is discharge order? Yes      Assign Questionnaire Series to Patient       Checo Gonzalez MD  Orthopedic Surgery PGY-4  Pager: [phone]

## 2020-09-04 NOTE — PROGRESS NOTES
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 Seen in clinic recently with symptoms and imaging consistent with femoral component aseptic loosening. Labs normal and low suspicion for infectious cause. Discussed revision surgery and patient elected to proceed.        ASSESSMENT      Discharge Assessment  Patient reports symptoms are: Improved  Does the patient have all of their medications?: Yes  Does patient know what their new medications are for?: Yes  Does patient have a follow-up appointment scheduled?: Yes  Does patient have any other questions or concerns?: No    Post-op  Did the patient have surgery or a procedure: Yes  Incision: healing  Drainage: No  Bleeding: none  Fever: No  Chills: No  Redness: No  Warmth: No  Swelling: No  Incision site pain: No  Closure: suture  Eating & Drinking: eating and drinking without complaints/concerns  PO Intake: regular diet  Bowel Function: normal  Urinary Status: voiding without complaint/concerns        PLAN                                                      Outpatient Plan:      Future Appointments   Date Time Provider Department Center   9/17/2020  2:45 PM Mike Decker MD [facility]           [facility]

## 2020-09-05 LAB
BACTERIA SPEC CULT: NO GROWTH
Lab: NORMAL
SPECIMEN SOURCE: NORMAL

## 2020-09-07 LAB
BACTERIA SPEC CULT: NORMAL
Lab: NORMAL
SPECIMEN SOURCE: NORMAL

## 2020-09-17 ENCOUNTER — OFFICE VISIT (OUTPATIENT)
Dept: ORTHOPEDICS | Facility: CLINIC | Age: 55
End: 2020-09-17
Payer: COMMERCIAL

## 2020-09-17 DIAGNOSIS — Z96.652 STATUS POST TOTAL LEFT KNEE REPLACEMENT: Primary | ICD-10-CM

## 2020-09-17 NOTE — LETTER
9/17/2020         RE: Meliton Mortensen  [address] [address]        Dear Colleague,    Thank you for referring your patient, Meliton Mortensen, to the [facility]. Please see a copy of my visit note below.    Diagnosis: 1. Chondrosarcoma left proximal tibia  2. Loosening of femoral stem     Treatment: 1. Hinged knee replacement, 2004  2. Revision left femoral stem. Sept 1, 2020    Meliton is seen today for his postop visit.  He reports he feels well.  He describes having 0 to 100 degrees of knee motion.  His preoperative pain is resolved and is very pleased with his results.  He is been active, walking almost without restrictions.    On exam his incision is healing nicely his motion is excellent as described above.    I reviewed his postoperative x-rays with him and his wife.  He has excellent alignment and fixation.    Impression: Doing well following a revision of his femoral stem.    Plan: 1. Follow-up in 1 year with an x-ray.  2.  We will prescribe physical therapy at home in [place].        Sincerely,        Mike Decker MD

## 2020-09-17 NOTE — NURSING NOTE
Chief Complaint   Patient presents with     Surgical Followup     2 wk post-op left knee revision DOS 8/31/20       54 year old  1965               Pain Assessment  Patient Currently in Pain: Yes  0-10 Pain Scale: 2  Primary Pain Location: Knee(left)                  [id number] - [place], [place] - [address]  [facility] [address]    No Known Allergies    Current Outpatient Medications   Medication     acetaminophen (TYLENOL) 325 MG tablet     aspirin (ASA) 81 MG EC tablet     oxyCODONE (ROXICODONE) 5 MG tablet     polyethylene glycol (MIRALAX) 17 g packet     senna-docusate (SENOKOT-S/PERICOLACE) 8.6-50 MG tablet     No current facility-administered medications for this visit.

## 2020-09-17 NOTE — PROGRESS NOTES
Diagnosis: 1. Chondrosarcoma left proximal tibia  2. Loosening of femoral stem     Treatment: 1. Hinged knee replacement, 2004  2. Revision left femoral stem. Sept 1, 2020    Meliton is seen today for his postop visit.  He reports he feels well.  He describes having 0 to 100 degrees of knee motion.  His preoperative pain is resolved and is very pleased with his results.  He is been active, walking almost without restrictions.    On exam his incision is healing nicely his motion is excellent as described above.    I reviewed his postoperative x-rays with him and his wife.  He has excellent alignment and fixation.    Impression: Doing well following a revision of his femoral stem.    Plan: 1. Follow-up in 1 year with an x-ray.  2.  We will prescribe physical therapy at home in [place].

## 2020-09-29 ENCOUNTER — TRANSFERRED RECORDS (OUTPATIENT)
Dept: HEALTH INFORMATION MANAGEMENT | Facility: CLINIC | Age: 55
End: 2020-09-29

## 2021-01-09 ENCOUNTER — HEALTH MAINTENANCE LETTER (OUTPATIENT)
Age: 56
End: 2021-01-09

## 2021-10-23 ENCOUNTER — HEALTH MAINTENANCE LETTER (OUTPATIENT)
Age: 56
End: 2021-10-23

## 2022-02-12 ENCOUNTER — HEALTH MAINTENANCE LETTER (OUTPATIENT)
Age: 57
End: 2022-02-12

## 2022-10-09 ENCOUNTER — HEALTH MAINTENANCE LETTER (OUTPATIENT)
Age: 57
End: 2022-10-09

## 2023-03-25 ENCOUNTER — HEALTH MAINTENANCE LETTER (OUTPATIENT)
Age: 58
End: 2023-03-25

## (undated) DEVICE — SOL NACL 0.9% IRRIG 3000ML BAG 2B7477

## (undated) DEVICE — TOURNIQUET CUFF 34" REPRO BROWN 60-7070-106

## (undated) DEVICE — BLADE SAW SAGITTAL STRK 18X90X1.27MM HD SYS 6 6118-127-090

## (undated) DEVICE — WIPES FOLEY CARE SURESTEP PROVON DFC100

## (undated) DEVICE — DRSG TEGADERM 4X4 3/4" 1626W

## (undated) DEVICE — SU PDS II 3-0 PS-1 18" Z683G

## (undated) DEVICE — PREP CHLORAPREP 26ML TINTED ORANGE  260815

## (undated) DEVICE — DRSG TEGADERM 4X10" 1627

## (undated) DEVICE — BONE CEMENT SIMPLEX FULL DOSE 6191-1-001: Type: IMPLANTABLE DEVICE | Site: KNEE | Status: NON-FUNCTIONAL

## (undated) DEVICE — BLADE SAW SAGITTAL STRK 25X90X1.27MM HD SYS 6 6125-127-090

## (undated) DEVICE — LINEN BACK PACK 5440

## (undated) DEVICE — APPLICATORS COTTON TIP 6"X2 STERILE LF C15053-006

## (undated) DEVICE — GLOVE PROTEXIS W/NEU-THERA 7.0  2D73TE70

## (undated) DEVICE — SU VICRYL 2-0 CT-1 27" UND J259H

## (undated) DEVICE — DRAIN JACKSON PRATT RESERVOIR 100ML SU130-1305

## (undated) DEVICE — GLOVE PROTEXIS W/NEU-THERA 7.5  2D73TE75

## (undated) DEVICE — ESU GROUND PAD UNIVERSAL W/O CORD

## (undated) DEVICE — BASIN SET MAJOR

## (undated) DEVICE — LINEN GOWN X4 5410

## (undated) DEVICE — DRAIN JACKSON PRATT 10MM FLAT 3/4 PERF

## (undated) DEVICE — BNDG ELASTIC 6"X5YDS STERILE 6611-6S

## (undated) DEVICE — DRAPE IOBAN INCISE 23X17" 6650EZ

## (undated) DEVICE — SUCTION MANIFOLD DORNOCH ULTRA CART UL-CL500

## (undated) DEVICE — Device

## (undated) DEVICE — SOL NACL 0.9% IRRIG 1000ML BOTTLE 2F7124

## (undated) DEVICE — SPONGE LAP 18X18" X8435

## (undated) DEVICE — CATH TRAY FOLEY SURESTEP 16FR WDRAIN BAG STLK LATEX A300316A

## (undated) DEVICE — GLOVE PROTEXIS BLUE W/NEU-THERA 7.5  2D73EB75

## (undated) DEVICE — SUCTION IRR SYSTEM W/O TIP INTERPULSE HANDPIECE 0210-100-000

## (undated) DEVICE — LINEN TOWEL PACK X5 5464

## (undated) DEVICE — STRAP KNEE/BODY 31143004

## (undated) DEVICE — MRH KNEE TIBIAL INSERT
Type: IMPLANTABLE DEVICE | Site: KNEE | Status: NON-FUNCTIONAL
Brand: MRH
Removed: 2020-08-31

## (undated) DEVICE — TAPE DURAPORE 3" SILK 1538-3

## (undated) DEVICE — SU VICRYL 0 CT-1 3X27" J430T

## (undated) DEVICE — GLOVE PROTEXIS BLUE W/NEU-THERA 8.0  2D73EB80

## (undated) DEVICE — SOL WATER IRRIG 1000ML BOTTLE 2F7114

## (undated) DEVICE — TUBE CULTURE AEROBIC/ANAEROBIC W/O SWABS A.C.T.I.1. 12401

## (undated) DEVICE — DRSG STERI STRIP 1/2X4" R1547

## (undated) DEVICE — GOWN XLG DISP 9545

## (undated) DEVICE — BONE CLEANING TIP INTERPULSE  0210-010-000

## (undated) DEVICE — BLADE KNIFE SURG 10 371110

## (undated) DEVICE — BONE CEMENT MIXEVAC III HI VAC KIT  0206-015-000

## (undated) DEVICE — DRSG DRAIN 4X4" 7086

## (undated) RX ORDER — FENTANYL CITRATE 50 UG/ML
INJECTION, SOLUTION INTRAMUSCULAR; INTRAVENOUS
Status: DISPENSED
Start: 2020-08-31

## (undated) RX ORDER — CEFAZOLIN SODIUM 1 G/3ML
INJECTION, POWDER, FOR SOLUTION INTRAMUSCULAR; INTRAVENOUS
Status: DISPENSED
Start: 2020-08-31

## (undated) RX ORDER — HYDROMORPHONE HYDROCHLORIDE 1 MG/ML
INJECTION, SOLUTION INTRAMUSCULAR; INTRAVENOUS; SUBCUTANEOUS
Status: DISPENSED
Start: 2020-08-31

## (undated) RX ORDER — TRANEXAMIC ACID 650 MG/1
TABLET ORAL
Status: DISPENSED
Start: 2020-08-31

## (undated) RX ORDER — ONDANSETRON 2 MG/ML
INJECTION INTRAMUSCULAR; INTRAVENOUS
Status: DISPENSED
Start: 2020-08-31

## (undated) RX ORDER — PROPOFOL 10 MG/ML
INJECTION, EMULSION INTRAVENOUS
Status: DISPENSED
Start: 2020-08-31

## (undated) RX ORDER — LIDOCAINE HYDROCHLORIDE 20 MG/ML
INJECTION, SOLUTION EPIDURAL; INFILTRATION; INTRACAUDAL; PERINEURAL
Status: DISPENSED
Start: 2020-08-31

## (undated) RX ORDER — CEFAZOLIN SODIUM 2 G/100ML
INJECTION, SOLUTION INTRAVENOUS
Status: DISPENSED
Start: 2020-08-31